# Patient Record
Sex: FEMALE | Race: WHITE | NOT HISPANIC OR LATINO | Employment: OTHER | URBAN - METROPOLITAN AREA
[De-identification: names, ages, dates, MRNs, and addresses within clinical notes are randomized per-mention and may not be internally consistent; named-entity substitution may affect disease eponyms.]

---

## 2023-01-01 ENCOUNTER — HOSPITAL ENCOUNTER (INPATIENT)
Facility: HOSPITAL | Age: 78
LOS: 1 days | Discharge: HOSPICE | DRG: 853 | End: 2023-06-27
Attending: EMERGENCY MEDICINE | Admitting: INTERNAL MEDICINE
Payer: MEDICARE

## 2023-01-01 ENCOUNTER — HOSPITAL ENCOUNTER (INPATIENT)
Facility: HOSPITAL | Age: 78
LOS: 1 days | DRG: 951 | End: 2023-06-28
Attending: INTERNAL MEDICINE | Admitting: INTERNAL MEDICINE
Payer: MEDICARE

## 2023-01-01 ENCOUNTER — HOSPITAL ENCOUNTER (EMERGENCY)
Facility: HOSPITAL | Age: 78
Discharge: HOME | End: 2023-06-14
Attending: EMERGENCY MEDICINE
Payer: MEDICARE

## 2023-01-01 VITALS
HEART RATE: 90 BPM | SYSTOLIC BLOOD PRESSURE: 170 MMHG | RESPIRATION RATE: 16 BRPM | BODY MASS INDEX: 22.08 KG/M2 | DIASTOLIC BLOOD PRESSURE: 80 MMHG | HEIGHT: 62 IN | WEIGHT: 120 LBS | OXYGEN SATURATION: 98 % | TEMPERATURE: 99 F

## 2023-01-01 VITALS
RESPIRATION RATE: 7 BRPM | SYSTOLIC BLOOD PRESSURE: 93 MMHG | OXYGEN SATURATION: 96 % | TEMPERATURE: 99 F | DIASTOLIC BLOOD PRESSURE: 50 MMHG

## 2023-01-01 VITALS
WEIGHT: 120 LBS | HEIGHT: 61 IN | HEART RATE: 61 BPM | DIASTOLIC BLOOD PRESSURE: 59 MMHG | RESPIRATION RATE: 34 BRPM | OXYGEN SATURATION: 95 % | TEMPERATURE: 98 F | SYSTOLIC BLOOD PRESSURE: 104 MMHG | BODY MASS INDEX: 22.66 KG/M2
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
## 2023-01-01 DIAGNOSIS — R29.6 FREQUENT FALLS: Primary | ICD-10-CM

## 2023-01-01 DIAGNOSIS — R41.82 ALTERED MENTAL STATUS: ICD-10-CM

## 2023-01-01 DIAGNOSIS — R57.9 SHOCK: Primary | ICD-10-CM

## 2023-01-01 DIAGNOSIS — R53.1 WEAKNESS: ICD-10-CM

## 2023-01-01 DIAGNOSIS — I51.3 LEFT VENTRICULAR THROMBUS: ICD-10-CM

## 2023-01-01 DIAGNOSIS — N17.9 AKI (ACUTE KIDNEY INJURY): ICD-10-CM

## 2023-01-01 DIAGNOSIS — I95.9 HYPOTENSION: ICD-10-CM

## 2023-01-01 LAB
ABO + RH BLD: NORMAL
ALBUMIN SERPL BCP-MCNC: 3 G/DL (ref 3.5–5.2)
ALBUMIN SERPL BCP-MCNC: 3.5 G/DL (ref 3.5–5.2)
ALLENS TEST: ABNORMAL
ALP SERPL-CCNC: 112 U/L (ref 55–135)
ALP SERPL-CCNC: 251 U/L (ref 55–135)
ALT SERPL W/O P-5'-P-CCNC: 20 U/L (ref 10–44)
ALT SERPL W/O P-5'-P-CCNC: 2411 U/L (ref 10–44)
AMPHET+METHAMPHET UR QL: NEGATIVE
AMPHET+METHAMPHET UR QL: NEGATIVE
ANION GAP SERPL CALC-SCNC: 13 MMOL/L (ref 8–16)
ANION GAP SERPL CALC-SCNC: 16 MMOL/L (ref 8–16)
ANION GAP SERPL CALC-SCNC: 17 MMOL/L (ref 8–16)
ANION GAP SERPL CALC-SCNC: 18 MMOL/L (ref 8–16)
ANION GAP SERPL CALC-SCNC: 19 MMOL/L (ref 8–16)
APAP SERPL-MCNC: <3 UG/ML (ref 10–20)
ASCENDING AORTA: 3.03 CM
AST SERPL-CCNC: 2083 U/L (ref 10–40)
AST SERPL-CCNC: 21 U/L (ref 10–40)
BACTERIA #/AREA URNS AUTO: ABNORMAL /HPF
BACTERIA #/AREA URNS AUTO: ABNORMAL /HPF
BACTERIA UR CULT: ABNORMAL
BARBITURATES UR QL SCN>200 NG/ML: NEGATIVE
BARBITURATES UR QL SCN>200 NG/ML: NEGATIVE
BASOPHILS # BLD AUTO: 0.02 K/UL (ref 0–0.2)
BASOPHILS # BLD AUTO: 0.06 K/UL (ref 0–0.2)
BASOPHILS NFR BLD: 0.2 % (ref 0–1.9)
BASOPHILS NFR BLD: 0.3 % (ref 0–1.9)
BENZODIAZ UR QL SCN>200 NG/ML: NEGATIVE
BENZODIAZ UR QL SCN>200 NG/ML: NEGATIVE
BILIRUB SERPL-MCNC: 0.6 MG/DL (ref 0.1–1)
BILIRUB SERPL-MCNC: 1.5 MG/DL (ref 0.1–1)
BILIRUB UR QL STRIP: NEGATIVE
BILIRUB UR QL STRIP: NEGATIVE
BLD GP AB SCN CELLS X3 SERPL QL: NORMAL
BNP SERPL-MCNC: 3784 PG/ML (ref 0–99)
BSA FOR ECHO PROCEDURE: 1.53 M2
BUN SERPL-MCNC: 23 MG/DL (ref 8–23)
BUN SERPL-MCNC: 58 MG/DL (ref 8–23)
BUN SERPL-MCNC: 60 MG/DL (ref 8–23)
BUN SERPL-MCNC: 65 MG/DL (ref 6–30)
BUN SERPL-MCNC: 66 MG/DL (ref 8–23)
BUN SERPL-MCNC: 66 MG/DL (ref 8–23)
BZE UR QL SCN: NEGATIVE
BZE UR QL SCN: NEGATIVE
CALCIUM SERPL-MCNC: 7.6 MG/DL (ref 8.7–10.5)
CALCIUM SERPL-MCNC: 7.8 MG/DL (ref 8.7–10.5)
CALCIUM SERPL-MCNC: 7.9 MG/DL (ref 8.7–10.5)
CALCIUM SERPL-MCNC: 8.3 MG/DL (ref 8.7–10.5)
CALCIUM SERPL-MCNC: 8.8 MG/DL (ref 8.7–10.5)
CANNABINOIDS UR QL SCN: NEGATIVE
CANNABINOIDS UR QL SCN: NEGATIVE
CHLORIDE SERPL-SCNC: 100 MMOL/L (ref 95–110)
CHLORIDE SERPL-SCNC: 102 MMOL/L (ref 95–110)
CHLORIDE SERPL-SCNC: 103 MMOL/L (ref 95–110)
CHLORIDE SERPL-SCNC: 103 MMOL/L (ref 95–110)
CHLORIDE SERPL-SCNC: 106 MMOL/L (ref 95–110)
CHLORIDE SERPL-SCNC: 95 MMOL/L (ref 95–110)
CK SERPL-CCNC: 434 U/L (ref 20–180)
CK SERPL-CCNC: 466 U/L (ref 20–180)
CLARITY UR REFRACT.AUTO: ABNORMAL
CLARITY UR REFRACT.AUTO: ABNORMAL
CO2 SERPL-SCNC: 12 MMOL/L (ref 23–29)
CO2 SERPL-SCNC: 12 MMOL/L (ref 23–29)
CO2 SERPL-SCNC: 20 MMOL/L (ref 23–29)
CO2 SERPL-SCNC: 7 MMOL/L (ref 23–29)
CO2 SERPL-SCNC: 9 MMOL/L (ref 23–29)
COLOR UR AUTO: YELLOW
COLOR UR AUTO: YELLOW
CREAT SERPL-MCNC: 0.8 MG/DL (ref 0.5–1.4)
CREAT SERPL-MCNC: 1.6 MG/DL (ref 0.5–1.4)
CREAT SERPL-MCNC: 1.7 MG/DL (ref 0.5–1.4)
CREAT SERPL-MCNC: 1.9 MG/DL (ref 0.5–1.4)
CREAT SERPL-MCNC: 2 MG/DL (ref 0.5–1.4)
CREAT SERPL-MCNC: 2.1 MG/DL (ref 0.5–1.4)
CREAT UR-MCNC: 67 MG/DL (ref 15–325)
CREAT UR-MCNC: 75 MG/DL (ref 15–325)
CV ECHO LV RWT: 0.36 CM
DELSYS: ABNORMAL
DELSYS: ABNORMAL
DIFFERENTIAL METHOD: ABNORMAL
DIFFERENTIAL METHOD: ABNORMAL
DOP CALC LVOT AREA: 3.1 CM2
DOP CALC LVOT DIAMETER: 1.98 CM
DOP CALC LVOT PEAK VEL: 0.45 M/S
DOP CALC LVOT STROKE VOLUME: 21.57 CM3
DOP CALCLVOT PEAK VEL VTI: 7.01 CM
ECHO LV POSTERIOR WALL: 0.97 CM (ref 0.6–1.1)
EJECTION FRACTION: 20 %
EOSINOPHIL # BLD AUTO: 0.1 K/UL (ref 0–0.5)
EOSINOPHIL # BLD AUTO: 0.1 K/UL (ref 0–0.5)
EOSINOPHIL NFR BLD: 0.4 % (ref 0–8)
EOSINOPHIL NFR BLD: 0.7 % (ref 0–8)
ERYTHROCYTE [DISTWIDTH] IN BLOOD BY AUTOMATED COUNT: 17 % (ref 11.5–14.5)
ERYTHROCYTE [DISTWIDTH] IN BLOOD BY AUTOMATED COUNT: 17 % (ref 11.5–14.5)
EST. GFR  (NO RACE VARIABLE): 24.1 ML/MIN/1.73 M^2
EST. GFR  (NO RACE VARIABLE): 27.2 ML/MIN/1.73 M^2
EST. GFR  (NO RACE VARIABLE): 31.1 ML/MIN/1.73 M^2
EST. GFR  (NO RACE VARIABLE): 33.4 ML/MIN/1.73 M^2
EST. GFR  (NO RACE VARIABLE): >60 ML/MIN/1.73 M^2
ETHANOL SERPL-MCNC: <10 MG/DL
ETHANOL SERPL-MCNC: <10 MG/DL
ETHANOL UR-MCNC: <10 MG/DL
FLOW: 15
FRACTIONAL SHORTENING: 10 % (ref 28–44)
GLUCOSE SERPL-MCNC: 104 MG/DL (ref 70–110)
GLUCOSE SERPL-MCNC: 105 MG/DL (ref 70–110)
GLUCOSE SERPL-MCNC: 106 MG/DL (ref 70–110)
GLUCOSE SERPL-MCNC: 110 MG/DL (ref 70–110)
GLUCOSE SERPL-MCNC: 91 MG/DL (ref 70–110)
GLUCOSE SERPL-MCNC: 99 MG/DL (ref 70–110)
GLUCOSE UR QL STRIP: NEGATIVE
GLUCOSE UR QL STRIP: NEGATIVE
HAV IGM SERPL QL IA: NORMAL
HBV CORE IGM SERPL QL IA: NORMAL
HBV SURFACE AG SERPL QL IA: NORMAL
HCO3 UR-SCNC: 14.1 MMOL/L (ref 24–28)
HCO3 UR-SCNC: 15.1 MMOL/L (ref 24–28)
HCO3 UR-SCNC: 8.9 MMOL/L (ref 24–28)
HCT VFR BLD AUTO: 36.3 % (ref 37–48.5)
HCT VFR BLD AUTO: 36.4 % (ref 37–48.5)
HCT VFR BLD CALC: 37 %PCV (ref 36–54)
HCT VFR BLD CALC: 39 %PCV (ref 36–54)
HCV AB SERPL QL IA: NORMAL
HCV AB SERPL QL IA: NORMAL
HGB BLD-MCNC: 11 G/DL (ref 12–16)
HGB BLD-MCNC: 11.5 G/DL (ref 12–16)
HGB UR QL STRIP: NEGATIVE
HGB UR QL STRIP: NEGATIVE
HIV 1+2 AB+HIV1 P24 AG SERPL QL IA: NORMAL
HYALINE CASTS UR QL AUTO: 11 /LPF
HYALINE CASTS UR QL AUTO: 4 /LPF
IMM GRANULOCYTES # BLD AUTO: 0.04 K/UL (ref 0–0.04)
IMM GRANULOCYTES # BLD AUTO: 0.3 K/UL (ref 0–0.04)
IMM GRANULOCYTES NFR BLD AUTO: 0.5 % (ref 0–0.5)
IMM GRANULOCYTES NFR BLD AUTO: 1.7 % (ref 0–0.5)
INR PPP: 2.6 (ref 0.8–1.2)
INTERVENTRICULAR SEPTUM: 0.92 CM (ref 0.6–1.1)
KETONES UR QL STRIP: NEGATIVE
KETONES UR QL STRIP: NEGATIVE
LA MAJOR: 6.57 CM
LA MINOR: 5.87 CM
LA WIDTH: 4.24 CM
LACTATE SERPL-SCNC: 5.6 MMOL/L (ref 0.5–2.2)
LACTATE SERPL-SCNC: 6.9 MMOL/L (ref 0.5–2.2)
LDH SERPL L TO P-CCNC: 5.46 MMOL/L (ref 0.5–2.2)
LDH SERPL L TO P-CCNC: 7.46 MMOL/L (ref 0.5–2.2)
LEFT ATRIUM SIZE: 5.13 CM
LEFT ATRIUM VOLUME INDEX: 75.4 ML/M2
LEFT ATRIUM VOLUME: 114.63 CM3
LEFT INTERNAL DIMENSION IN SYSTOLE: 4.86 CM (ref 2.1–4)
LEFT VENTRICLE DIASTOLIC VOLUME INDEX: 93.14 ML/M2
LEFT VENTRICLE DIASTOLIC VOLUME: 141.58 ML
LEFT VENTRICLE MASS INDEX: 126 G/M2
LEFT VENTRICLE SYSTOLIC VOLUME INDEX: 72.6 ML/M2
LEFT VENTRICLE SYSTOLIC VOLUME: 110.42 ML
LEFT VENTRICULAR INTERNAL DIMENSION IN DIASTOLE: 5.4 CM (ref 3.5–6)
LEFT VENTRICULAR MASS: 191.92 G
LEUKOCYTE ESTERASE UR QL STRIP: ABNORMAL
LEUKOCYTE ESTERASE UR QL STRIP: ABNORMAL
LIPASE SERPL-CCNC: 25 U/L (ref 4–60)
LYMPHOCYTES # BLD AUTO: 1 K/UL (ref 1–4.8)
LYMPHOCYTES # BLD AUTO: 1.6 K/UL (ref 1–4.8)
LYMPHOCYTES NFR BLD: 19.8 % (ref 18–48)
LYMPHOCYTES NFR BLD: 5.6 % (ref 18–48)
MAGNESIUM SERPL-MCNC: 2.3 MG/DL (ref 1.6–2.6)
MCH RBC QN AUTO: 26.4 PG (ref 27–31)
MCH RBC QN AUTO: 27.5 PG (ref 27–31)
MCHC RBC AUTO-ENTMCNC: 30.2 G/DL (ref 32–36)
MCHC RBC AUTO-ENTMCNC: 31.7 G/DL (ref 32–36)
MCV RBC AUTO: 83 FL (ref 82–98)
MCV RBC AUTO: 91 FL (ref 82–98)
METHADONE UR QL SCN>300 NG/ML: NEGATIVE
METHADONE UR QL SCN>300 NG/ML: NEGATIVE
MICROSCOPIC COMMENT: ABNORMAL
MICROSCOPIC COMMENT: ABNORMAL
MODE: ABNORMAL
MODE: ABNORMAL
MONOCYTES # BLD AUTO: 1 K/UL (ref 0.3–1)
MONOCYTES # BLD AUTO: 1.5 K/UL (ref 0.3–1)
MONOCYTES NFR BLD: 12.5 % (ref 4–15)
MONOCYTES NFR BLD: 8.7 % (ref 4–15)
NEUTROPHILS # BLD AUTO: 14.3 K/UL (ref 1.8–7.7)
NEUTROPHILS # BLD AUTO: 5.4 K/UL (ref 1.8–7.7)
NEUTROPHILS NFR BLD: 66.3 % (ref 38–73)
NEUTROPHILS NFR BLD: 83.3 % (ref 38–73)
NITRITE UR QL STRIP: NEGATIVE
NITRITE UR QL STRIP: NEGATIVE
NRBC BLD-RTO: 0 /100 WBC
NRBC BLD-RTO: 0 /100 WBC
OPIATES UR QL SCN: NEGATIVE
OPIATES UR QL SCN: NEGATIVE
PCO2 BLDA: 28.8 MMHG (ref 35–45)
PCO2 BLDA: 45.5 MMHG (ref 35–45)
PCO2 BLDA: 47 MMHG (ref 35–45)
PCP UR QL SCN>25 NG/ML: NEGATIVE
PCP UR QL SCN>25 NG/ML: NEGATIVE
PH SMN: 7.1 [PH] (ref 7.35–7.45)
PH SMN: 7.1 [PH] (ref 7.35–7.45)
PH SMN: 7.11 [PH] (ref 7.35–7.45)
PH UR STRIP: 6 [PH] (ref 5–8)
PH UR STRIP: 6 [PH] (ref 5–8)
PISA TR MAX VEL: 1.62 M/S
PLATELET # BLD AUTO: 308 K/UL (ref 150–450)
PLATELET # BLD AUTO: 325 K/UL (ref 150–450)
PMV BLD AUTO: 10 FL (ref 9.2–12.9)
PMV BLD AUTO: 9.6 FL (ref 9.2–12.9)
PO2 BLDA: 19 MMHG (ref 40–60)
PO2 BLDA: 24 MMHG (ref 40–60)
PO2 BLDA: 33 MMHG (ref 40–60)
POC BE: -14 MMOL/L
POC BE: -16 MMOL/L
POC BE: -21 MMOL/L
POC IONIZED CALCIUM: 1.06 MMOL/L (ref 1.06–1.42)
POC IONIZED CALCIUM: 1.08 MMOL/L (ref 1.06–1.42)
POC SATURATED O2: 18 % (ref 95–100)
POC SATURATED O2: 27 % (ref 95–100)
POC SATURATED O2: 45 % (ref 95–100)
POC TCO2 (MEASURED): 14 MMOL/L (ref 23–29)
POC TCO2: 10 MMOL/L (ref 24–29)
POC TCO2: 16 MMOL/L (ref 24–29)
POC TCO2: 16 MMOL/L (ref 24–29)
POCT GLUCOSE: 110 MG/DL (ref 70–110)
POCT GLUCOSE: 119 MG/DL (ref 70–110)
POCT GLUCOSE: 177 MG/DL (ref 70–110)
POTASSIUM BLD-SCNC: 7.1 MMOL/L (ref 3.5–5.1)
POTASSIUM BLD-SCNC: 7.8 MMOL/L (ref 3.5–5.1)
POTASSIUM SERPL-SCNC: 3.5 MMOL/L (ref 3.5–5.1)
POTASSIUM SERPL-SCNC: 5.9 MMOL/L (ref 3.5–5.1)
POTASSIUM SERPL-SCNC: 6 MMOL/L (ref 3.5–5.1)
POTASSIUM SERPL-SCNC: 6.5 MMOL/L (ref 3.5–5.1)
POTASSIUM SERPL-SCNC: 7 MMOL/L (ref 3.5–5.1)
PROCALCITONIN SERPL IA-MCNC: 0.66 NG/ML
PROT SERPL-MCNC: 5.2 G/DL (ref 6–8.4)
PROT SERPL-MCNC: 6.4 G/DL (ref 6–8.4)
PROT UR QL STRIP: ABNORMAL
PROT UR QL STRIP: ABNORMAL
PROTHROMBIN TIME: 26.7 SEC (ref 9–12.5)
QEF: 21 %
RA MAJOR: 6.12 CM
RA WIDTH: 4.14 CM
RBC # BLD AUTO: 4 M/UL (ref 4–5.4)
RBC # BLD AUTO: 4.35 M/UL (ref 4–5.4)
RBC #/AREA URNS AUTO: 0 /HPF (ref 0–4)
RBC #/AREA URNS AUTO: 1 /HPF (ref 0–4)
RIGHT VENTRICULAR END-DIASTOLIC DIMENSION: 3.17 CM
SALICYLATES SERPL-MCNC: <5 MG/DL (ref 15–30)
SAMPLE: ABNORMAL
SINUS: 3.03 CM
SITE: ABNORMAL
SODIUM BLD-SCNC: 132 MMOL/L (ref 136–145)
SODIUM BLD-SCNC: 132 MMOL/L (ref 136–145)
SODIUM SERPL-SCNC: 126 MMOL/L (ref 136–145)
SODIUM SERPL-SCNC: 128 MMOL/L (ref 136–145)
SODIUM SERPL-SCNC: 128 MMOL/L (ref 136–145)
SODIUM SERPL-SCNC: 129 MMOL/L (ref 136–145)
SODIUM SERPL-SCNC: 139 MMOL/L (ref 136–145)
SP GR UR STRIP: 1.01 (ref 1–1.03)
SP GR UR STRIP: 1.01 (ref 1–1.03)
SPECIMEN OUTDATE: NORMAL
SQUAMOUS #/AREA URNS AUTO: 1 /HPF
SQUAMOUS #/AREA URNS AUTO: 2 /HPF
STJ: 2.89 CM
TDI LATERAL: 0.05 M/S
TDI SEPTAL: 0.04 M/S
TDI: 0.05 M/S
TOXICOLOGY INFORMATION: NORMAL
TOXICOLOGY INFORMATION: NORMAL
TR MAX PG: 10 MMHG
TROPONIN I SERPL DL<=0.01 NG/ML-MCNC: 0.06 NG/ML (ref 0–0.03)
TROPONIN I SERPL DL<=0.01 NG/ML-MCNC: 0.06 NG/ML (ref 0–0.03)
TSH SERPL DL<=0.005 MIU/L-ACNC: 2.05 UIU/ML (ref 0.4–4)
TSH SERPL DL<=0.005 MIU/L-ACNC: 3.61 UIU/ML (ref 0.4–4)
URN SPEC COLLECT METH UR: ABNORMAL
URN SPEC COLLECT METH UR: ABNORMAL
WBC # BLD AUTO: 17.19 K/UL (ref 3.9–12.7)
WBC # BLD AUTO: 8.1 K/UL (ref 3.9–12.7)
WBC #/AREA URNS AUTO: 20 /HPF (ref 0–5)
WBC #/AREA URNS AUTO: 6 /HPF (ref 0–5)

## 2023-01-01 PROCEDURE — 87088 URINE BACTERIA CULTURE: CPT | Performed by: EMERGENCY MEDICINE

## 2023-01-01 PROCEDURE — 99284 EMERGENCY DEPT VISIT MOD MDM: CPT | Mod: ,,, | Performed by: EMERGENCY MEDICINE

## 2023-01-01 PROCEDURE — C1751 CATH, INF, PER/CENT/MIDLINE: HCPCS

## 2023-01-01 PROCEDURE — 25500020 PHARM REV CODE 255: Performed by: INTERNAL MEDICINE

## 2023-01-01 PROCEDURE — 63600175 PHARM REV CODE 636 W HCPCS: Performed by: NURSE PRACTITIONER

## 2023-01-01 PROCEDURE — 25000003 PHARM REV CODE 250: Performed by: NURSE PRACTITIONER

## 2023-01-01 PROCEDURE — 25000003 PHARM REV CODE 250: Performed by: EMERGENCY MEDICINE

## 2023-01-01 PROCEDURE — 84132 ASSAY OF SERUM POTASSIUM: CPT

## 2023-01-01 PROCEDURE — 84484 ASSAY OF TROPONIN QUANT: CPT | Mod: 91 | Performed by: EMERGENCY MEDICINE

## 2023-01-01 PROCEDURE — 85025 COMPLETE CBC W/AUTO DIFF WBC: CPT | Performed by: EMERGENCY MEDICINE

## 2023-01-01 PROCEDURE — 99223 PR INITIAL HOSPITAL CARE,LEVL III: ICD-10-PCS | Mod: 25,GC,, | Performed by: OTOLARYNGOLOGY

## 2023-01-01 PROCEDURE — 86900 BLOOD TYPING SEROLOGIC ABO: CPT | Performed by: EMERGENCY MEDICINE

## 2023-01-01 PROCEDURE — 63600175 PHARM REV CODE 636 W HCPCS

## 2023-01-01 PROCEDURE — 99499 NO LOS: ICD-10-PCS | Mod: ,,, | Performed by: INTERNAL MEDICINE

## 2023-01-01 PROCEDURE — 99900035 HC TECH TIME PER 15 MIN (STAT)

## 2023-01-01 PROCEDURE — 20000000 HC ICU ROOM

## 2023-01-01 PROCEDURE — 99223 1ST HOSP IP/OBS HIGH 75: CPT | Mod: 25,GC,, | Performed by: OTOLARYNGOLOGY

## 2023-01-01 PROCEDURE — 63600175 PHARM REV CODE 636 W HCPCS: Performed by: INTERNAL MEDICINE

## 2023-01-01 PROCEDURE — 99284 EMERGENCY DEPT VISIT MOD MDM: CPT | Mod: 25

## 2023-01-01 PROCEDURE — 96376 TX/PRO/DX INJ SAME DRUG ADON: CPT

## 2023-01-01 PROCEDURE — 80179 DRUG ASSAY SALICYLATE: CPT | Performed by: EMERGENCY MEDICINE

## 2023-01-01 PROCEDURE — 80074 ACUTE HEPATITIS PANEL: CPT | Performed by: STUDENT IN AN ORGANIZED HEALTH CARE EDUCATION/TRAINING PROGRAM

## 2023-01-01 PROCEDURE — 96366 THER/PROPH/DIAG IV INF ADDON: CPT

## 2023-01-01 PROCEDURE — 81001 URINALYSIS AUTO W/SCOPE: CPT | Performed by: EMERGENCY MEDICINE

## 2023-01-01 PROCEDURE — 63600175 PHARM REV CODE 636 W HCPCS: Performed by: STUDENT IN AN ORGANIZED HEALTH CARE EDUCATION/TRAINING PROGRAM

## 2023-01-01 PROCEDURE — 82550 ASSAY OF CK (CPK): CPT | Performed by: STUDENT IN AN ORGANIZED HEALTH CARE EDUCATION/TRAINING PROGRAM

## 2023-01-01 PROCEDURE — 82077 ASSAY SPEC XCP UR&BREATH IA: CPT | Performed by: PHYSICIAN ASSISTANT

## 2023-01-01 PROCEDURE — 84443 ASSAY THYROID STIM HORMONE: CPT | Performed by: EMERGENCY MEDICINE

## 2023-01-01 PROCEDURE — 96375 TX/PRO/DX INJ NEW DRUG ADDON: CPT

## 2023-01-01 PROCEDURE — 63600175 PHARM REV CODE 636 W HCPCS: Performed by: EMERGENCY MEDICINE

## 2023-01-01 PROCEDURE — 82803 BLOOD GASES ANY COMBINATION: CPT

## 2023-01-01 PROCEDURE — 85014 HEMATOCRIT: CPT

## 2023-01-01 PROCEDURE — 99499 UNLISTED E&M SERVICE: CPT | Mod: ,,, | Performed by: INTERNAL MEDICINE

## 2023-01-01 PROCEDURE — 99291 PR CRITICAL CARE, E/M 30-74 MINUTES: ICD-10-PCS | Mod: ,,, | Performed by: INTERNAL MEDICINE

## 2023-01-01 PROCEDURE — 99285 EMERGENCY DEPT VISIT HI MDM: CPT | Mod: 25

## 2023-01-01 PROCEDURE — 99291 PR CRITICAL CARE, E/M 30-74 MINUTES: ICD-10-PCS | Mod: ,,, | Performed by: NURSE PRACTITIONER

## 2023-01-01 PROCEDURE — 83605 ASSAY OF LACTIC ACID: CPT

## 2023-01-01 PROCEDURE — 93010 EKG 12-LEAD: ICD-10-PCS | Mod: ,,, | Performed by: INTERNAL MEDICINE

## 2023-01-01 PROCEDURE — 84295 ASSAY OF SERUM SODIUM: CPT

## 2023-01-01 PROCEDURE — 99284 PR EMERGENCY DEPT VISIT,LEVEL IV: ICD-10-PCS | Mod: ,,, | Performed by: EMERGENCY MEDICINE

## 2023-01-01 PROCEDURE — 80143 DRUG ASSAY ACETAMINOPHEN: CPT | Performed by: NURSE PRACTITIONER

## 2023-01-01 PROCEDURE — 85610 PROTHROMBIN TIME: CPT | Performed by: EMERGENCY MEDICINE

## 2023-01-01 PROCEDURE — 82330 ASSAY OF CALCIUM: CPT

## 2023-01-01 PROCEDURE — 80048 BASIC METABOLIC PNL TOTAL CA: CPT | Mod: 91,XB | Performed by: NURSE PRACTITIONER

## 2023-01-01 PROCEDURE — 25000003 PHARM REV CODE 250: Performed by: INTERNAL MEDICINE

## 2023-01-01 PROCEDURE — 87389 HIV-1 AG W/HIV-1&-2 AB AG IA: CPT | Performed by: PHYSICIAN ASSISTANT

## 2023-01-01 PROCEDURE — 86803 HEPATITIS C AB TEST: CPT | Performed by: PHYSICIAN ASSISTANT

## 2023-01-01 PROCEDURE — 25000003 PHARM REV CODE 250: Performed by: STUDENT IN AN ORGANIZED HEALTH CARE EDUCATION/TRAINING PROGRAM

## 2023-01-01 PROCEDURE — 96368 THER/DIAG CONCURRENT INF: CPT

## 2023-01-01 PROCEDURE — 87086 URINE CULTURE/COLONY COUNT: CPT | Performed by: EMERGENCY MEDICINE

## 2023-01-01 PROCEDURE — 84484 ASSAY OF TROPONIN QUANT: CPT | Performed by: STUDENT IN AN ORGANIZED HEALTH CARE EDUCATION/TRAINING PROGRAM

## 2023-01-01 PROCEDURE — 81001 URINALYSIS AUTO W/SCOPE: CPT | Mod: 59 | Performed by: PHYSICIAN ASSISTANT

## 2023-01-01 PROCEDURE — 93010 ELECTROCARDIOGRAM REPORT: CPT | Mod: ,,, | Performed by: INTERNAL MEDICINE

## 2023-01-01 PROCEDURE — 93005 ELECTROCARDIOGRAM TRACING: CPT

## 2023-01-01 PROCEDURE — 84443 ASSAY THYROID STIM HORMONE: CPT | Performed by: PHYSICIAN ASSISTANT

## 2023-01-01 PROCEDURE — 94761 N-INVAS EAR/PLS OXIMETRY MLT: CPT

## 2023-01-01 PROCEDURE — 85025 COMPLETE CBC W/AUTO DIFF WBC: CPT | Performed by: PHYSICIAN ASSISTANT

## 2023-01-01 PROCEDURE — 36410 VNPNXR 3YR/> PHY/QHP DX/THER: CPT

## 2023-01-01 PROCEDURE — 80053 COMPREHEN METABOLIC PANEL: CPT | Performed by: EMERGENCY MEDICINE

## 2023-01-01 PROCEDURE — 82962 GLUCOSE BLOOD TEST: CPT

## 2023-01-01 PROCEDURE — 83605 ASSAY OF LACTIC ACID: CPT | Mod: 91 | Performed by: STUDENT IN AN ORGANIZED HEALTH CARE EDUCATION/TRAINING PROGRAM

## 2023-01-01 PROCEDURE — 99291 CRITICAL CARE FIRST HOUR: CPT | Mod: ,,, | Performed by: INTERNAL MEDICINE

## 2023-01-01 PROCEDURE — 25000003 PHARM REV CODE 250

## 2023-01-01 PROCEDURE — 82077 ASSAY SPEC XCP UR&BREATH IA: CPT | Performed by: EMERGENCY MEDICINE

## 2023-01-01 PROCEDURE — 87040 BLOOD CULTURE FOR BACTERIA: CPT | Performed by: EMERGENCY MEDICINE

## 2023-01-01 PROCEDURE — 27000221 HC OXYGEN, UP TO 24 HOURS

## 2023-01-01 PROCEDURE — 51702 INSERT TEMP BLADDER CATH: CPT

## 2023-01-01 PROCEDURE — 86920 COMPATIBILITY TEST SPIN: CPT | Performed by: NURSE PRACTITIONER

## 2023-01-01 PROCEDURE — 80307 DRUG TEST PRSMV CHEM ANLYZR: CPT | Performed by: PHYSICIAN ASSISTANT

## 2023-01-01 PROCEDURE — 82550 ASSAY OF CK (CPK): CPT | Mod: 91 | Performed by: EMERGENCY MEDICINE

## 2023-01-01 PROCEDURE — 83735 ASSAY OF MAGNESIUM: CPT | Performed by: EMERGENCY MEDICINE

## 2023-01-01 PROCEDURE — 83605 ASSAY OF LACTIC ACID: CPT | Performed by: NURSE PRACTITIONER

## 2023-01-01 PROCEDURE — 96365 THER/PROPH/DIAG IV INF INIT: CPT

## 2023-01-01 PROCEDURE — 84145 PROCALCITONIN (PCT): CPT | Performed by: EMERGENCY MEDICINE

## 2023-01-01 PROCEDURE — 76937 US GUIDE VASCULAR ACCESS: CPT

## 2023-01-01 PROCEDURE — 80048 BASIC METABOLIC PNL TOTAL CA: CPT | Mod: XB | Performed by: STUDENT IN AN ORGANIZED HEALTH CARE EDUCATION/TRAINING PROGRAM

## 2023-01-01 PROCEDURE — 41120 PARTIAL REMOVAL OF TONGUE: CPT | Mod: ,,, | Performed by: OTOLARYNGOLOGY

## 2023-01-01 PROCEDURE — 80307 DRUG TEST PRSMV CHEM ANLYZR: CPT | Performed by: STUDENT IN AN ORGANIZED HEALTH CARE EDUCATION/TRAINING PROGRAM

## 2023-01-01 PROCEDURE — 80053 COMPREHEN METABOLIC PANEL: CPT | Performed by: PHYSICIAN ASSISTANT

## 2023-01-01 PROCEDURE — 41120 PR PART REMOVAL TONGUE,<1/2: ICD-10-PCS | Mod: ,,, | Performed by: OTOLARYNGOLOGY

## 2023-01-01 PROCEDURE — 87186 SC STD MICRODIL/AGAR DIL: CPT | Mod: 59 | Performed by: EMERGENCY MEDICINE

## 2023-01-01 PROCEDURE — 80048 BASIC METABOLIC PNL TOTAL CA: CPT | Mod: 91,XB | Performed by: INTERNAL MEDICINE

## 2023-01-01 PROCEDURE — 83690 ASSAY OF LIPASE: CPT | Performed by: EMERGENCY MEDICINE

## 2023-01-01 PROCEDURE — 87077 CULTURE AEROBIC IDENTIFY: CPT | Mod: 59 | Performed by: EMERGENCY MEDICINE

## 2023-01-01 PROCEDURE — 83880 ASSAY OF NATRIURETIC PEPTIDE: CPT | Performed by: EMERGENCY MEDICINE

## 2023-01-01 PROCEDURE — 99291 CRITICAL CARE FIRST HOUR: CPT | Mod: ,,, | Performed by: NURSE PRACTITIONER

## 2023-01-01 RX ORDER — DEXTROAMPHETAMINE SACCHARATE, AMPHETAMINE ASPARTATE, DEXTROAMPHETAMINE SULFATE AND AMPHETAMINE SULFATE 7.5; 7.5; 7.5; 7.5 MG/1; MG/1; MG/1; MG/1
TABLET ORAL
COMMUNITY

## 2023-01-01 RX ORDER — NOREPINEPHRINE BITARTRATE/D5W 4MG/250ML
0-3 PLASTIC BAG, INJECTION (ML) INTRAVENOUS CONTINUOUS
Status: DISCONTINUED | OUTPATIENT
Start: 2023-01-01 | End: 2023-01-01

## 2023-01-01 RX ORDER — AMIODARONE HYDROCHLORIDE 200 MG/1
TABLET ORAL
COMMUNITY

## 2023-01-01 RX ORDER — CALCIUM GLUCONATE 20 MG/ML
1 INJECTION, SOLUTION INTRAVENOUS
Status: COMPLETED | OUTPATIENT
Start: 2023-01-01 | End: 2023-01-01

## 2023-01-01 RX ORDER — HALOPERIDOL 5 MG/ML
1 INJECTION INTRAMUSCULAR EVERY 4 HOURS PRN
Status: CANCELLED | OUTPATIENT
Start: 2023-01-01

## 2023-01-01 RX ORDER — DILTIAZEM HYDROCHLORIDE 120 MG/1
CAPSULE, COATED, EXTENDED RELEASE ORAL
COMMUNITY

## 2023-01-01 RX ORDER — BUPROPION HYDROCHLORIDE 150 MG/1
TABLET ORAL
COMMUNITY

## 2023-01-01 RX ORDER — SCOLOPAMINE TRANSDERMAL SYSTEM 1 MG/1
1 PATCH, EXTENDED RELEASE TRANSDERMAL
Status: DISCONTINUED | OUTPATIENT
Start: 2023-01-01 | End: 2023-01-01 | Stop reason: HOSPADM

## 2023-01-01 RX ORDER — SCOLOPAMINE TRANSDERMAL SYSTEM 1 MG/1
1 PATCH, EXTENDED RELEASE TRANSDERMAL
Status: CANCELLED | OUTPATIENT
Start: 2023-06-30

## 2023-01-01 RX ORDER — CALCIUM GLUCONATE 98 MG/ML
INJECTION, SOLUTION INTRAVENOUS
Status: DISPENSED
Start: 2023-01-01 | End: 2023-01-01

## 2023-01-01 RX ORDER — DEXMEDETOMIDINE HYDROCHLORIDE 4 UG/ML
INJECTION, SOLUTION INTRAVENOUS
Status: COMPLETED
Start: 2023-01-01 | End: 2023-01-01

## 2023-01-01 RX ORDER — SODIUM CHLORIDE 0.9 % (FLUSH) 0.9 %
10 SYRINGE (ML) INJECTION
Status: DISCONTINUED | OUTPATIENT
Start: 2023-01-01 | End: 2023-01-01 | Stop reason: HOSPADM

## 2023-01-01 RX ORDER — CHLORDIAZEPOXIDE HYDROCHLORIDE 5 MG/1
CAPSULE, GELATIN COATED ORAL
COMMUNITY

## 2023-01-01 RX ORDER — HYDROMORPHONE HYDROCHLORIDE 1 MG/ML
0.5 INJECTION, SOLUTION INTRAMUSCULAR; INTRAVENOUS; SUBCUTANEOUS
Status: DISCONTINUED | OUTPATIENT
Start: 2023-01-01 | End: 2023-01-01

## 2023-01-01 RX ORDER — HALOPERIDOL 5 MG/ML
1 INJECTION INTRAMUSCULAR EVERY 4 HOURS PRN
Status: DISCONTINUED | OUTPATIENT
Start: 2023-01-01 | End: 2023-01-01 | Stop reason: HOSPADM

## 2023-01-01 RX ORDER — NOREPINEPHRINE BITARTRATE/D5W 4MG/250ML
PLASTIC BAG, INJECTION (ML) INTRAVENOUS
Status: COMPLETED
Start: 2023-01-01 | End: 2023-01-01

## 2023-01-01 RX ORDER — MORPHINE SULFATE 2 MG/ML
INJECTION, SOLUTION INTRAMUSCULAR; INTRAVENOUS
Status: COMPLETED
Start: 2023-01-01 | End: 2023-01-01

## 2023-01-01 RX ORDER — HYDROCODONE BITARTRATE AND ACETAMINOPHEN 500; 5 MG/1; MG/1
TABLET ORAL
Status: DISCONTINUED | OUTPATIENT
Start: 2023-01-01 | End: 2023-01-01

## 2023-01-01 RX ORDER — POTASSIUM CHLORIDE 750 MG/1
10 TABLET, EXTENDED RELEASE ORAL ONCE
COMMUNITY

## 2023-01-01 RX ORDER — MEMANTINE HYDROCHLORIDE 5 MG/1
5 TABLET ORAL 2 TIMES DAILY
COMMUNITY

## 2023-01-01 RX ORDER — FAMOTIDINE 10 MG/ML
20 INJECTION INTRAVENOUS DAILY
Status: DISCONTINUED | OUTPATIENT
Start: 2023-01-01 | End: 2023-01-01

## 2023-01-01 RX ORDER — FENTANYL CITRATE 50 UG/ML
50 INJECTION, SOLUTION INTRAMUSCULAR; INTRAVENOUS
Status: COMPLETED | OUTPATIENT
Start: 2023-01-01 | End: 2023-01-01

## 2023-01-01 RX ORDER — MORPHINE SULFATE 2 MG/ML
1 INJECTION, SOLUTION INTRAMUSCULAR; INTRAVENOUS ONCE
Status: COMPLETED | OUTPATIENT
Start: 2023-01-01 | End: 2023-01-01

## 2023-01-01 RX ORDER — NOREPINEPHRINE BITARTRATE/D5W 4MG/250ML
0-.2 PLASTIC BAG, INJECTION (ML) INTRAVENOUS CONTINUOUS
Status: DISCONTINUED | OUTPATIENT
Start: 2023-01-01 | End: 2023-01-01

## 2023-01-01 RX ORDER — METOPROLOL SUCCINATE 25 MG/1
TABLET, EXTENDED RELEASE ORAL
COMMUNITY

## 2023-01-01 RX ORDER — CELECOXIB 200 MG/1
200 CAPSULE ORAL
COMMUNITY

## 2023-01-01 RX ORDER — DOXEPIN HYDROCHLORIDE 25 MG/1
CAPSULE ORAL
COMMUNITY

## 2023-01-01 RX ORDER — AMLODIPINE BESYLATE 10 MG/1
10 TABLET ORAL
COMMUNITY

## 2023-01-01 RX ORDER — ALBUTEROL SULFATE 90 UG/1
AEROSOL, METERED RESPIRATORY (INHALATION)
COMMUNITY

## 2023-01-01 RX ORDER — INDOMETHACIN 25 MG/1
50 CAPSULE ORAL
Status: COMPLETED | OUTPATIENT
Start: 2023-01-01 | End: 2023-01-01

## 2023-01-01 RX ORDER — SODIUM CHLORIDE 0.9 % (FLUSH) 0.9 %
10 SYRINGE (ML) INJECTION
Status: CANCELLED | OUTPATIENT
Start: 2023-01-01

## 2023-01-01 RX ORDER — DEXMEDETOMIDINE HYDROCHLORIDE 4 UG/ML
0-1.4 INJECTION, SOLUTION INTRAVENOUS CONTINUOUS
Status: DISCONTINUED | OUTPATIENT
Start: 2023-01-01 | End: 2023-01-01

## 2023-01-01 RX ORDER — MUPIROCIN 20 MG/G
OINTMENT TOPICAL 2 TIMES DAILY
Status: DISCONTINUED | OUTPATIENT
Start: 2023-01-01 | End: 2023-01-01

## 2023-01-01 RX ORDER — SCOLOPAMINE TRANSDERMAL SYSTEM 1 MG/1
1 PATCH, EXTENDED RELEASE TRANSDERMAL
Status: DISCONTINUED | OUTPATIENT
Start: 2023-06-30 | End: 2023-01-01 | Stop reason: HOSPADM

## 2023-01-01 RX ORDER — HYDROMORPHONE HYDROCHLORIDE 1 MG/ML
0.2 INJECTION, SOLUTION INTRAMUSCULAR; INTRAVENOUS; SUBCUTANEOUS EVERY 4 HOURS PRN
Status: DISCONTINUED | OUTPATIENT
Start: 2023-01-01 | End: 2023-01-01

## 2023-01-01 RX ADMIN — HYDROMORPHONE HYDROCHLORIDE 0.5 MG: 1 INJECTION, SOLUTION INTRAMUSCULAR; INTRAVENOUS; SUBCUTANEOUS at 10:06

## 2023-01-01 RX ADMIN — VANCOMYCIN HYDROCHLORIDE 1000 MG: 1 INJECTION, POWDER, LYOPHILIZED, FOR SOLUTION INTRAVENOUS at 01:06

## 2023-01-01 RX ADMIN — CALCIUM GLUCONATE 1 G: 98 INJECTION, SOLUTION INTRAVENOUS at 08:06

## 2023-01-01 RX ADMIN — MORPHINE SULFATE 1 MG: 2 INJECTION, SOLUTION INTRAMUSCULAR; INTRAVENOUS at 06:06

## 2023-01-01 RX ADMIN — HYDROMORPHONE HYDROCHLORIDE 0.2 MG/HR: 10 INJECTION, SOLUTION INTRAMUSCULAR; INTRAVENOUS; SUBCUTANEOUS at 05:06

## 2023-01-01 RX ADMIN — SODIUM BICARBONATE: 84 INJECTION, SOLUTION INTRAVENOUS at 01:06

## 2023-01-01 RX ADMIN — SODIUM CHLORIDE 1000 ML: 0.9 INJECTION, SOLUTION INTRAVENOUS at 07:06

## 2023-01-01 RX ADMIN — CEFEPIME 1 G: 1 INJECTION, POWDER, FOR SOLUTION INTRAMUSCULAR; INTRAVENOUS at 04:06

## 2023-01-01 RX ADMIN — HYDROMORPHONE HYDROCHLORIDE 0.2 MG: 1 INJECTION, SOLUTION INTRAMUSCULAR; INTRAVENOUS; SUBCUTANEOUS at 08:06

## 2023-01-01 RX ADMIN — HALOPERIDOL LACTATE 1 MG: 5 INJECTION, SOLUTION INTRAMUSCULAR at 07:06

## 2023-01-01 RX ADMIN — DEXMEDETOMIDINE HYDROCHLORIDE 0.2 MCG/KG/HR: 4 INJECTION, SOLUTION INTRAVENOUS at 06:06

## 2023-01-01 RX ADMIN — PIPERACILLIN AND TAZOBACTAM 4.5 G: 4; .5 INJECTION, POWDER, LYOPHILIZED, FOR SOLUTION INTRAVENOUS; PARENTERAL at 09:06

## 2023-01-01 RX ADMIN — ACETYLCYSTEINE 2700 MG: 200 INJECTION, SOLUTION INTRAVENOUS at 05:06

## 2023-01-01 RX ADMIN — FAMOTIDINE 20 MG: 10 INJECTION, SOLUTION INTRAVENOUS at 02:06

## 2023-01-01 RX ADMIN — MUPIROCIN: 20 OINTMENT TOPICAL at 12:06

## 2023-01-01 RX ADMIN — HYDROMORPHONE HYDROCHLORIDE 0.5 MG: 1 INJECTION, SOLUTION INTRAMUSCULAR; INTRAVENOUS; SUBCUTANEOUS at 08:06

## 2023-01-01 RX ADMIN — INSULIN HUMAN 5 UNITS: 100 INJECTION, SOLUTION PARENTERAL at 01:06

## 2023-01-01 RX ADMIN — CALCIUM GLUCONATE 1 G: 20 INJECTION, SOLUTION INTRAVENOUS at 12:06

## 2023-01-01 RX ADMIN — ACETYLCYSTEINE 8200 MG: 200 INJECTION, SOLUTION INTRAVENOUS at 03:06

## 2023-01-01 RX ADMIN — FENTANYL CITRATE 50 MCG: 50 INJECTION INTRAMUSCULAR; INTRAVENOUS at 07:06

## 2023-01-01 RX ADMIN — SODIUM BICARBONATE 50 MEQ: 84 INJECTION, SOLUTION INTRAVENOUS at 07:06

## 2023-01-01 RX ADMIN — HYDROMORPHONE HYDROCHLORIDE 0.5 MG: 1 INJECTION, SOLUTION INTRAMUSCULAR; INTRAVENOUS; SUBCUTANEOUS at 01:06

## 2023-01-01 RX ADMIN — NOREPINEPHRINE BITARTRATE 0.02 MCG/KG/MIN: 4 INJECTION, SOLUTION INTRAVENOUS at 11:06

## 2023-01-01 RX ADMIN — DEXTROSE MONOHYDRATE 250 ML: 100 INJECTION, SOLUTION INTRAVENOUS at 12:06

## 2023-01-01 RX ADMIN — HALOPERIDOL LACTATE 1 MG: 5 INJECTION, SOLUTION INTRAMUSCULAR at 08:06

## 2023-01-01 RX ADMIN — Medication 4 MG: at 08:06

## 2023-01-01 RX ADMIN — NOREPINEPHRINE BITARTRATE 4 MG: 4 INJECTION, SOLUTION INTRAVENOUS at 08:06

## 2023-01-01 RX ADMIN — SCOPALAMINE 1 PATCH: 1 PATCH, EXTENDED RELEASE TRANSDERMAL at 01:06

## 2023-01-01 RX ADMIN — DEXTROSE MONOHYDRATE 250 ML: 100 INJECTION, SOLUTION INTRAVENOUS at 08:06

## 2023-01-01 RX ADMIN — HUMAN ALBUMIN MICROSPHERES AND PERFLUTREN 0.66 MG: 10; .22 INJECTION, SOLUTION INTRAVENOUS at 01:06

## 2023-01-01 RX ADMIN — HYDROMORPHONE HYDROCHLORIDE 0.5 MG: 1 INJECTION, SOLUTION INTRAMUSCULAR; INTRAVENOUS; SUBCUTANEOUS at 03:06

## 2023-01-01 RX ADMIN — CALCIUM GLUCONATE 1 G: 20 INJECTION, SOLUTION INTRAVENOUS at 07:06

## 2023-01-01 RX ADMIN — HYDROMORPHONE HYDROCHLORIDE 0.5 MG: 1 INJECTION, SOLUTION INTRAMUSCULAR; INTRAVENOUS; SUBCUTANEOUS at 07:06

## 2023-01-01 RX ADMIN — IOHEXOL 75 ML: 350 INJECTION, SOLUTION INTRAVENOUS at 10:06

## 2023-01-01 RX ADMIN — INSULIN HUMAN 5 UNITS: 100 INJECTION, SOLUTION PARENTERAL at 08:06

## 2023-06-13 PROBLEM — J30.2 SEASONAL ALLERGIC RHINITIS: Status: ACTIVE | Noted: 2023-01-01

## 2023-06-13 PROBLEM — R41.3 MEMORY IMPAIRMENT: Status: ACTIVE | Noted: 2023-01-01

## 2023-06-13 PROBLEM — E78.49 OTHER HYPERLIPIDEMIA: Status: ACTIVE | Noted: 2021-09-07

## 2023-06-13 PROBLEM — I10 HYPERTENSION: Status: ACTIVE | Noted: 2021-09-07

## 2023-06-13 PROBLEM — N30.00 ACUTE CYSTITIS WITHOUT HEMATURIA: Status: ACTIVE | Noted: 2021-09-07

## 2023-06-13 PROBLEM — M20.40 HAMMER TOE: Status: ACTIVE | Noted: 2023-01-01

## 2023-06-13 PROBLEM — D62 ACUTE BLOOD LOSS ANEMIA: Status: ACTIVE | Noted: 2021-09-07

## 2023-06-13 PROBLEM — I25.10 CORONARY ATHEROSCLEROSIS: Status: ACTIVE | Noted: 2023-01-01

## 2023-06-13 PROBLEM — L03.119 CELLULITIS OF UPPER EXTREMITY: Status: ACTIVE | Noted: 2022-01-07

## 2023-06-13 PROBLEM — L98.9 SKIN PROBLEM: Status: ACTIVE | Noted: 2022-01-07

## 2023-06-13 PROBLEM — S72.8X2A OTHER FRACTURE OF LEFT FEMUR, INITIAL ENCOUNTER FOR CLOSED FRACTURE: Status: ACTIVE | Noted: 2021-09-07

## 2023-06-13 PROBLEM — K21.9 GASTROESOPHAGEAL REFLUX DISEASE WITHOUT ESOPHAGITIS: Status: ACTIVE | Noted: 2021-09-07

## 2023-06-13 NOTE — ED TRIAGE NOTES
Pt presents to the ED for AMS. Family reports a decline from baseline. Pt states she fell today and hit the back of her head.

## 2023-06-13 NOTE — ED PROVIDER NOTES
"Encounter Date: 6/13/2023       History     Chief Complaint   Patient presents with    Multiple complaints     Hx dementia daughter reports declining over 3 d, 3 er visits in past weeks head injuries, not able to care for self,      76 yo F with hx of CABG, HTN, dementia presents to the ED with frequent falls and altered mental status. Pt's son is present and reports that the patient has been having "psychotic episodes" over the last 1.5 wks.  He reports that she will roll on the ground screaming in the yard. She once told the son that she would "bite his penis off".  He also reports numerous falls with head trauma over about the same duration.  Son does report that the patient has had intermittent similar episodes sporadically over the past several years.  She has had many falls with head trauma over the past decade.  He feels that the symptoms have worsened acutely in the past 1.5 weeks.  Pt does admit to not feeling right in her head. She does not recall the episodes that the son is referring to.  She denies any significant weakness, difficulty breathing, chest pain, changes in urination, fevers, headaches, vomiting or other acute complaints. Son reports several ED visits over the past week for falls, trauma, changes in behavior.  He states that these are small hospitals and they only monitor the patient for a few hours and then discharge her.  He is not clear on what tests were done during these evaluations. He is currently in the process of getting the patient admitted to a long-term care facility in [place], [place]. She has been living alone in [place], [place]. They presented to [facility] today as the patient is currently staying with the son who owns a home in [place].      Review of patient's allergies indicates:  No Known Allergies  History reviewed. No pertinent past medical history.  History reviewed. No pertinent surgical history.  History reviewed. No pertinent family history.     Review of Systems "   Psychiatric/Behavioral:  Positive for behavioral problems.      Physical Exam     Initial Vitals [06/13/23 1630]   BP Pulse Resp Temp SpO2   (!) 174/101 92 18 98.1 °F (36.7 °C) 96 %      MAP       --         Physical Exam    Nursing note and vitals reviewed.  Constitutional: She appears well-developed and well-nourished. She is not diaphoretic.  Non-toxic appearance. She does not appear ill. No distress.   HENT:   Head: Normocephalic.   Large area of ecchymosis to the posterior scalp    Neck: Neck supple.   Cardiovascular:  Normal rate and regular rhythm.     Exam reveals no gallop and no friction rub.       No murmur heard.  Pulmonary/Chest: Effort normal and breath sounds normal. No accessory muscle usage. No tachypnea. No respiratory distress. She has no decreased breath sounds. She has no wheezes. She has no rhonchi. She has no rales.   Abdominal: She exhibits no distension.   Musculoskeletal:      Cervical back: Neck supple.      Comments: No midline C, T, L spinous process tenderness.      Neurological: She is alert. She has normal strength. No sensory deficit.   Patient is able to answer questions appropriately.  Speech is clear and fluent.  No facial droop or asymmetry.    No extremity drift.   Skin: No rash noted.   Psychiatric: She has a normal mood and affect. Her speech is normal and behavior is normal.   Good eye contact. Repeating questions multiple times.        ED Course   Procedures  Labs Reviewed   CBC W/ AUTO DIFFERENTIAL - Abnormal; Notable for the following components:       Result Value    Hemoglobin 11.5 (*)     Hematocrit 36.3 (*)     MCH 26.4 (*)     MCHC 31.7 (*)     RDW 17.0 (*)     All other components within normal limits   COMPREHENSIVE METABOLIC PANEL - Abnormal; Notable for the following components:    CO2 20 (*)     All other components within normal limits   URINALYSIS, REFLEX TO URINE CULTURE - Abnormal; Notable for the following components:    Appearance, UA Hazy (*)      Protein, UA 2+ (*)     Leukocytes, UA Trace (*)     All other components within normal limits    Narrative:     Specimen Source->Urine   URINALYSIS MICROSCOPIC - Abnormal; Notable for the following components:    WBC, UA 6 (*)     Bacteria Many (*)     Hyaline Casts, UA 11 (*)     All other components within normal limits    Narrative:     Specimen Source->Urine   HIV 1 / 2 ANTIBODY    Narrative:     Release to patient->Immediate   HEPATITIS C ANTIBODY    Narrative:     Release to patient->Immediate   TSH   DRUG SCREEN PANEL, URINE EMERGENCY    Narrative:     Specimen Source->Urine   ALCOHOL,MEDICAL (ETHANOL)          Imaging Results              X-Ray Hips Bilateral 2 View Incl AP Pelvis (Final result)  Result time 06/13/23 21:00:38      Final result by Asa Caldera MD (06/13/23 21:00:38)                   Impression:      No acute displaced fracture-dislocation identified.    Left hip total arthroplasty.      Electronically signed by: Asa Caldera MD  Date:    06/13/2023  Time:    21:00               Narrative:    EXAMINATION:  XR HIPS BILATERAL 2 VIEW INCL AP PELVIS    CLINICAL HISTORY:  Repeated falls    TECHNIQUE:  AP view of the pelvis and frogleg lateral views of both hips were performed.    COMPARISON:  None.    FINDINGS:  Generalized osteopenia.  Left hip total arthroplasty demonstrates anatomic positioning and alignment.  No evidence of acute hardware malalignment or loosening.  No acute displaced fracture, dislocation or destructive osseous process.  Age-related degenerative change at the imaged lower lumbar spine, pubic symphysis, right hip and bilateral SI joints.  Pelvic phleboliths and scattered atherosclerotic vascular calcifications noted.  No subcutaneous emphysema or radiodense retained foreign body.                                       X-Ray Chest AP Portable (Final result)  Result time 06/13/23 20:46:23      Final result by Andre Lewis MD (06/13/23 20:46:23)                    Impression:      No acute findings in the chest.    Mildly enlarged cardiomediastinal silhouette.  Sternotomy wires present.      Electronically signed by: Andre Lewis MD  Date:    06/13/2023  Time:    20:46               Narrative:    EXAMINATION:  XR CHEST AP PORTABLE    CLINICAL HISTORY:  Altered mental status, unspecified    TECHNIQUE:  Single frontal view of the chest was performed.    COMPARISON:  05/20/2010.    FINDINGS:  Sternotomy wires present.  Mediastinal clips present.    No consolidation, pleural effusion or pneumothorax.    Cardiomediastinal silhouette is mildly enlarged.  Calcification in the aortic knob.                                       CT Head Without Contrast (Final result)  Result time 06/13/23 19:09:49      Final result by Kings Farley MD (06/13/23 19:09:49)                   Impression:      1. No acute intracranial process.  2. Involutional changes with chronic microvascular ischemic changes.  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  3. See above comments also.      Electronically signed by: Kings Farley  Date:    06/13/2023  Time:    19:09               Narrative:    EXAMINATION:  CT HEAD WITHOUT CONTRAST    CLINICAL HISTORY:  Head trauma, minor (Age >= 65y);    TECHNIQUE:  Low dose axial CT images obtained throughout the head without intravenous contrast. Sagittal and coronal reconstructions were performed.    COMPARISON:  07/08/2011    FINDINGS:  Intracranial compartment:    No midline shift or hydrocephalus.  No extra-axial blood or fluid collections.    Moderate involutional changes and chronic microvascular ischemic changes in the periventricular white matter.    Remote inferior left frontal cortical infarct with residual encephalomalacia.    Remote lacunar infarct of the left caudate.    Remote lacunar infarct of the right superior and inferior cerebellum    Bilateral senescent basal ganglia  calcifications.    Stable midline 7 mm fatty focus at the posterior margin of the optic chiasm and suprasellar cistern.    No parenchymal mass, hemorrhage, edema or major vascular distribution infarct.    Skull/extracranial contents (limited evaluation): No fracture. Mastoid air cells and paranasal sinuses are essentially clear.                                       CT Cervical Spine Without Contrast (Final result)  Result time 06/13/23 19:39:59      Final result by Kings Farley MD (06/13/23 19:39:59)                   Impression:      1. No acute abnormality.  See above comments.  2. Multilevel chronic degenerative changes.  See above comments.      Electronically signed by: Kings Farley  Date:    06/13/2023  Time:    19:39               Narrative:    EXAMINATION:  CT CERVICAL SPINE WITHOUT CONTRAST    CLINICAL HISTORY:  Neck trauma (Age >= 65y);    TECHNIQUE:  Low dose axial CT images through the cervical spine, with sagittal and coronal reformations.  Contrast was not administered.    COMPARISON:  05/13/2010    FINDINGS:  No acute fractures of the cervical spine.  Probable mild chronic compression fracture of the superior endplate of T1 with mild anterior wedging.  No comminution or retropulsion.  MRI may better characterize if there is high clinical suspicion.  This is new from the 2010 study.    Minimal spondylolisthesis of C3 on C4.  Mild grade 1 retrolisthesis of C5 on C6.    Moderate disc space narrowing at C5-6 and C6-7.  Mild disc space narrowing elsewhere.    Degenerative endplate changes are noted at C2-3, C4-5, C6-7 and T2-3.    Mild facet arthropathy at C3-4 on the left.    Mild central disc protrusion at C three-four and to a slightly lesser degree C2-3.    Mild diffuse posterior disc osteophyte complex at C4-5, C5-6 and C6-7.    No significant central canal narrowing.    Severe foraminal narrowing at C3-4 bilaterally, C4-5 bilaterally, C5-6 bilaterally, C6-7 bilaterally.    Limited evaluation of  the intraspinal contents demonstrates no hematoma or mass.Paraspinal soft tissues exhibit no acute abnormalities.                                       Medications - No data to display  Medical Decision Making:   History:   Old Medical Records: I decided to obtain old medical records.  Initial Assessment:   77-year-old female presents to the ED with behavioral changes and frequent falls over the past several days per son.  Hypertensive but vitals otherwise within normal limits.  Afebrile.  Patient is alert, engaging and answering questions appropriately. She does repeat questions multiple times after being answered  Differential Diagnosis:   My differential diagnosis includes but is not limited to:  Dementia, delirium, electrolyte abnormality, psychosis, encephalopathy, UTI  Clinical Tests:   Lab Tests: Ordered  Radiological Study: Ordered  ED Management:  No concerning lab abnormalities.  CT and x-ray imaging showed no acute process/injury.  Patient did not appear psychotic and remained calm throughout ED evaluation.  Discussed potential treatment plan and disposition with patient's son.  He is concerned about the intermittent episodes of bizarre and aggressive behavior and understands that patient is not currently exhibiting these symptoms.  I agreed to discuss with psychiatry which I have done.  They will evaluate the patient and provide further recommendations.  Patient signed out to oncoming GER[facility]D pending full psychiatric evaluation and recommendations and until final disposition is reached.  I have reviewed the patient's records and discussed this case with my supervising physician.       Other:   I have discussed this case with another health care provider.       <> Summary of the Discussion: Psychiatry                        Clinical Impression:   Final diagnoses:  [R41.82] Altered mental status  [R29.6] Frequent falls (Primary)        ED Disposition Condition    Discharge Stable          ED Prescriptions     None       Follow-up Information       Follow up With Specialties Details Why Contact Info Additional Information    [facility] Physical Therapy Schedule an appointment as soon as possible for a visit in 1 week  [address]  [facility] [address] [phone]     [facility] Bl Internal Medicine Schedule an appointment as soon as possible for a visit in 1 week  [address]  [facility] [id number] [phone] [facility] Please park in surface lot and check in at central registration desk    [facility] - Emergency Dept Emergency Medicine Go to  If symptoms worsen [address]  [facility] [address] [phone]              Sharda Munoz PA-C  06/14/23 1055

## 2023-06-13 NOTE — LETTER
June 13, 2023    Dom Yates  [address]  [place] [address]                   [address] [facility] [address]  Phone: [phone]  Fax: [phone]   To whom it may concern:    Ms. Dom Yates was seen in the emergency department for a fall with waxing and waning altered mental status.  She has had frequent falls recently and I think that she could benefit from long-term care for her safety.      Sincerely,        Katty Arrgeuin PA-C

## 2023-06-14 NOTE — PROVIDER PROGRESS NOTES - EMERGENCY DEPT.
Encounter Date: 6/13/2023    ED Physician Progress Notes          ED Physician Hand-off Note:    ED Course: I assumed care of patient from off-going ED physician team. Briefly, Patient is a 77-year-old female presenting for frequent falls with waxing and waning confusion.    At the time of signout plan was pending psychiatry evaluation.    Medications given in the ED:    Medications - No data to display    Patient seen and evaluated by Psychiatry.  They do not recommend geriatric psychiatry admission at this time.  I spoke with the patient's son, he arrangements to place her in a long-term care facility.  He is requesting reports of her chest x-ray and recommendation for long-term care which I provided.  Instructed to follow up with primary care, referral provided to Internal Medicine and return to the ED for worsening symptoms. Stressed the importance of follow-up, strict ED return precautions given.  Patient voiced understanding and is comfortable with discharge. I discussed this patient with my supervising physician.

## 2023-06-14 NOTE — CONSULTS
"CONSULTATION LIAISON PSYCHIATRY INITIAL EVALUATION    Patient Name: Dom Yates  MRN: [id number]  Patient Class: Emergency  Admission Date: 6/13/2023  Attending Physician: Catherine Juarez MD      HPI:   Dom Yates is a 77 y.o. female with past psychiatric history of dementia (unspecified) & past pertinent medical history of CABG, HTN, and several recent falls presents to the ED for worsening of her dementia symptoms, including confusion and agitation.    Psychiatry consulted for "episodes of bizarre and aggressive behavior"    Per ED provider, pt was BIB son after becoming acutely agitated at home and yelling obscene threats.     Patient was seen in the ED with her son Darrion Yates ([phone]) at bedside. Son reports she has a history of dementia for which she has taken Namenda in the past. He is unsure of her recent compliance with it. He denied any history of inpatient or outpatient psychiatric care, or any other psychotropic medications besides Namenda. He says she usually lives alone in [facility] and is checked on by other friends/family but they have been more concerned with her ability to care for herself lately, so the patient has started the process of getting her into shelter care at [facility] in [place]. He believes she is about a week from being accepted and moving in there, however over the past week she has had several ED visits due to frequent falls. He brought her from [place] to [place] ~2 days ago and has noticed her becoming intermittently confused and off balance, then becoming agitated when he and his wife try to help her. This resulted in one threatening comment but she has not actually become violent. Medical workups during ED visits have been negative. They report intermittent adherence to walking with rolling walker at home. Denied that she has been depressed, paranoid, hallucinating, not sleeping/eating, or getting intentionally violent with them. Discussed " safety and delirium precautions around the home.    On psych exam, pt had euthymic affect, was alert and attentive to conversation, but frequently endorsed confusion. She had insight into her diagnosis of dementia but could not remember whether she has been taking medication for it. She was very concerned with how long she would be in the ED and frequently repeated the same questions about expected course, but was redirectable. She was oriented to hospital, city, month, and year. She denied SI, HI, AVH, insomnia, poor appetite. She did admit to worsening confusion and memory loss lately. She seemed to be aware of plan to move her into the facility in [place]. She was calm throughout interview and requested help with blankets,       Medical Review of Systems:  MEDICAL ROS    Complete review of systems performed covering Constitutional, Eyes, ENT/Mouth, Cardiovascular, Respiratory, Gastrointestinal, Genitourinary, Musculoskeletal, Skin, Neurologic, Endocrine, Heme/Lymph, and Allergy/Immune.     Complete review of systems was negative with the exception of the following positive symptoms: frequent falls      Psychiatric Review of Systems (is patient experiencing or having changes in):  sleep: no  appetite: no  weight: no  energy/anergy: no  interest/pleasure/anhedonia: no  somatic symptoms: no  libido: no  anxiety/panic: yes  guilty/hopelessness: no  concentration: no  Lola:no  Psychosis: no  Trauma: no  S.I.B.s/risky behavior: no    Past Psychiatric History:  Previous Medication Trials: yes, Namenda  Previous Psychiatric Hospitalizations:no   Previous Suicide Attempts: no  History of Violence: no  Outpatient Psychiatrist: no  Family Psychiatric History: no    Substance Abuse History (with emphasis over the last 12 months):  Recreational Drugs:  denied, UDS neg  Use of Alcohol: denied  Tobacco Use:not asked  Rehab History:no    Social History:  Marital Status:   Children: 1  Employment Status/Info:  "retired  :no  Education:  not asked  Special Ed: not asked  Housing Status: alone  Access to gun: no  Psychosocial Stressors: health  Functioning Relationships: good relationship with children    Legal History:  Past Charges/Incarcerations: no  Pending charges:no    Mental Status Exam:  General Appearance: appears stated age, well developed and nourished, adequately groomed and appropriately dressed, in no acute distress. Pt did have multiple bruises and scrapes in various stages of healing to BLE/BUE  Behavior: normal; cooperative; reasonably friendly, pleasant, and polite; appropriate eye-contact; under good behavioral control  Involuntary Movements and Motor Activity: no abnormal involuntary movements noted; no tics, no tremors, no akathisia, no dystonia, no evidence of tardive dyskinesia; no psychomotor agitation or retardation  Gait and Station: unable to assess - patient lying down or seated  Speech and Language: normal rate, rhythm, volume, tone, and pitch  Mood: "worried"  Affect: normal, euthymic, reactive, full-range, mood-congruent, appropriate to situation and context  Thought Process and Associations: circumstantial, scattered  Thought Content and Perceptions:: no suicidal or homicidal ideation, no auditory or visual hallucinations, no paranoid ideation, no ideas of reference, no evidence of delusions or psychosis  Sensorium and Orientation: intact; alert with clear sensorium; oriented fully to person, place, time and situation  Recent and Remote Memory: recent memory impaired, remote memory impaired  Attention and Concentration: grossly intact, attentive to the conversation and not readily distractible  Fund of Knowledge: grossly intact, used appropriate vocabulary and demonstrated an awareness of current events, consistent with educational level achieved  Insight: intact, demonstrates awareness of illness and situation  Judgment: compliant with health provider's recommendations and " instructions, reportedly having episodic lapses in judgment consistent with dementia-related behavioral disturbances    CAM ICU positive? no      ASSESSMENT & RECOMMENDATIONS   Dementia, with recent progression  Continue [facility] per established outpatient providers in [place]  Agree with plan to transition her to [facility] care ASAP for safety  Do not recommend geriatric psychiatric placement as patient does not appear to be gravely disabled or imminent threat to self/others  Safety and return precautions per ED    RISK ASSESSMENT  NO NEED FOR PEC patient NOT in any imminent danger of hurting self or others and not gravely disabled.     FOLLOW UP  Will follow up while in house  Will sign off. Patient can follow up with established outpatient health providers in [place] where she lives.    DISPOSITION - once medically cleared:   Patient may be discharged home with plan to transition to [facility] care ASAP. Family provided with ED return precautions.    Please contact ON CALL psychiatry service (24/7) for any acute issues that may arise.    Dr. Marta BAIN Psychiatry  [facility]  6/13/2023 11:18 PM        --------------------------------------------------------------------------------------------------------------------------------------------------------------------------------------------------------------------------------------    CONTINUED HISTORY & OBJECTIVE clinical data & findings reviewed and noted for above decision making    Past Medical/Surgical History:   History reviewed. No pertinent past medical history.  History reviewed. No pertinent surgical history.    Current Medications:   Scheduled Meds:   PRN Meds:   OTC Meds:     Allergies:   Review of patient's allergies indicates:  No Known Allergies    Vitals  Vitals:    06/13/23 2202   BP: (!) 173/98   Pulse:    Resp:    Temp:        Labs/Imaging/Studies:  Recent Results (from the past 24 hour(s))   HIV 1/2 Ag/Ab (4th Gen)     Collection Time: 06/13/23  6:22 PM   Result Value Ref Range    HIV 1/2 Ag/Ab Non-reactive Non-reactive   Hepatitis C Antibody    Collection Time: 06/13/23  6:22 PM   Result Value Ref Range    Hepatitis C Ab Non-reactive Non-reactive   CBC Auto Differential    Collection Time: 06/13/23  6:22 PM   Result Value Ref Range    WBC 8.10 3.90 - 12.70 K/uL    RBC 4.35 4.00 - 5.40 M/uL    Hemoglobin 11.5 (L) 12.0 - 16.0 g/dL    Hematocrit 36.3 (L) 37.0 - 48.5 %    MCV 83 82 - 98 fL    MCH 26.4 (L) 27.0 - 31.0 pg    MCHC 31.7 (L) 32.0 - 36.0 g/dL    RDW 17.0 (H) 11.5 - 14.5 %    Platelets 308 150 - 450 K/uL    MPV 9.6 9.2 - 12.9 fL    Immature Granulocytes 0.5 0.0 - 0.5 %    Gran # (ANC) 5.4 1.8 - 7.7 K/uL    Immature Grans (Abs) 0.04 0.00 - 0.04 K/uL    Lymph # 1.6 1.0 - 4.8 K/uL    Mono # 1.0 0.3 - 1.0 K/uL    Eos # 0.1 0.0 - 0.5 K/uL    Baso # 0.02 0.00 - 0.20 K/uL    nRBC 0 0 /100 WBC    Gran % 66.3 38.0 - 73.0 %    Lymph % 19.8 18.0 - 48.0 %    Mono % 12.5 4.0 - 15.0 %    Eosinophil % 0.7 0.0 - 8.0 %    Basophil % 0.2 0.0 - 1.9 %    Differential Method Automated    Comprehensive Metabolic Panel    Collection Time: 06/13/23  6:22 PM   Result Value Ref Range    Sodium 139 136 - 145 mmol/L    Potassium 3.5 3.5 - 5.1 mmol/L    Chloride 106 95 - 110 mmol/L    CO2 20 (L) 23 - 29 mmol/L    Glucose 91 70 - 110 mg/dL    BUN 23 8 - 23 mg/dL    Creatinine 0.8 0.5 - 1.4 mg/dL    Calcium 8.8 8.7 - 10.5 mg/dL    Total Protein 6.4 6.0 - 8.4 g/dL    Albumin 3.5 3.5 - 5.2 g/dL    Total Bilirubin 0.6 0.1 - 1.0 mg/dL    Alkaline Phosphatase 112 55 - 135 U/L    AST 21 10 - 40 U/L    ALT 20 10 - 44 U/L    Anion Gap 13 8 - 16 mmol/L    eGFR >60.0 >60 mL/min/1.73 m^2   TSH    Collection Time: 06/13/23  6:22 PM   Result Value Ref Range    TSH 2.048 0.400 - 4.000 uIU/mL   Ethanol    Collection Time: 06/13/23  6:22 PM   Result Value Ref Range    Alcohol, Serum <10 <10 mg/dL   Urinalysis, Reflex to Urine Culture Urine, Clean Catch    Collection  Time: 06/13/23  7:20 PM    Specimen: Urine   Result Value Ref Range    Specimen UA Urine, Clean Catch     Color, UA Yellow Yellow, Straw, Rosio    Appearance, UA Hazy (A) Clear    pH, UA 6.0 5.0 - 8.0    Specific Gravity, UA 1.015 1.005 - 1.030    Protein, UA 2+ (A) Negative    Glucose, UA Negative Negative    Ketones, UA Negative Negative    Bilirubin (UA) Negative Negative    Occult Blood UA Negative Negative    Nitrite, UA Negative Negative    Leukocytes, UA Trace (A) Negative   Drug screen panel, emergency    Collection Time: 06/13/23  7:20 PM   Result Value Ref Range    Benzodiazepines Negative Negative    Methadone metabolites Negative Negative    Cocaine (Metab.) Negative Negative    Opiate Scrn, Ur Negative Negative    Barbiturate Screen, Ur Negative Negative    Amphetamine Screen, Ur Negative Negative    THC Negative Negative    Phencyclidine Negative Negative    Creatinine, Urine 67.0 15.0 - 325.0 mg/dL    Toxicology Information SEE COMMENT    Urinalysis Microscopic    Collection Time: 06/13/23  7:20 PM   Result Value Ref Range    RBC, UA 1 0 - 4 /hpf    WBC, UA 6 (H) 0 - 5 /hpf    Bacteria Many (A) None-Occ /hpf    Squam Epithel, UA 1 /hpf    Hyaline Casts, UA 11 (A) 0-1/lpf /lpf    Microscopic Comment SEE COMMENT      Imaging Results              X-Ray Hips Bilateral 2 View Incl AP Pelvis (Final result)  Result time 06/13/23 21:00:38      Final result by Asa Caldera MD (06/13/23 21:00:38)                   Impression:      No acute displaced fracture-dislocation identified.    Left hip total arthroplasty.      Electronically signed by: Asa Caldera MD  Date:    06/13/2023  Time:    21:00               Narrative:    EXAMINATION:  XR HIPS BILATERAL 2 VIEW INCL AP PELVIS    CLINICAL HISTORY:  Repeated falls    TECHNIQUE:  AP view of the pelvis and frogleg lateral views of both hips were performed.    COMPARISON:  None.    FINDINGS:  Generalized osteopenia.  Left hip total arthroplasty demonstrates  anatomic positioning and alignment.  No evidence of acute hardware malalignment or loosening.  No acute displaced fracture, dislocation or destructive osseous process.  Age-related degenerative change at the imaged lower lumbar spine, pubic symphysis, right hip and bilateral SI joints.  Pelvic phleboliths and scattered atherosclerotic vascular calcifications noted.  No subcutaneous emphysema or radiodense retained foreign body.                                       X-Ray Chest AP Portable (Final result)  Result time 06/13/23 20:46:23      Final result by Andre Lewis MD (06/13/23 20:46:23)                   Impression:      No acute findings in the chest.    Mildly enlarged cardiomediastinal silhouette.  Sternotomy wires present.      Electronically signed by: Andre Lewis MD  Date:    06/13/2023  Time:    20:46               Narrative:    EXAMINATION:  XR CHEST AP PORTABLE    CLINICAL HISTORY:  Altered mental status, unspecified    TECHNIQUE:  Single frontal view of the chest was performed.    COMPARISON:  05/20/2010.    FINDINGS:  Sternotomy wires present.  Mediastinal clips present.    No consolidation, pleural effusion or pneumothorax.    Cardiomediastinal silhouette is mildly enlarged.  Calcification in the aortic knob.                                       CT Head Without Contrast (Final result)  Result time 06/13/23 19:09:49      Final result by Kings Farley MD (06/13/23 19:09:49)                   Impression:      1. No acute intracranial process.  2. Involutional changes with chronic microvascular ischemic changes.  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  3. See above comments also.      Electronically signed by: Kings Farley  Date:    06/13/2023  Time:    19:09               Narrative:    EXAMINATION:  CT HEAD WITHOUT CONTRAST    CLINICAL HISTORY:  Head trauma, minor (Age >= 65y);    TECHNIQUE:  Low dose axial  CT images obtained throughout the head without intravenous contrast. Sagittal and coronal reconstructions were performed.    COMPARISON:  07/08/2011    FINDINGS:  Intracranial compartment:    No midline shift or hydrocephalus.  No extra-axial blood or fluid collections.    Moderate involutional changes and chronic microvascular ischemic changes in the periventricular white matter.    Remote inferior left frontal cortical infarct with residual encephalomalacia.    Remote lacunar infarct of the left caudate.    Remote lacunar infarct of the right superior and inferior cerebellum    Bilateral senescent basal ganglia calcifications.    Stable midline 7 mm fatty focus at the posterior margin of the optic chiasm and suprasellar cistern.    No parenchymal mass, hemorrhage, edema or major vascular distribution infarct.    Skull/extracranial contents (limited evaluation): No fracture. Mastoid air cells and paranasal sinuses are essentially clear.                                       CT Cervical Spine Without Contrast (Final result)  Result time 06/13/23 19:39:59      Final result by Kings Farley MD (06/13/23 19:39:59)                   Impression:      1. No acute abnormality.  See above comments.  2. Multilevel chronic degenerative changes.  See above comments.      Electronically signed by: Kings Farley  Date:    06/13/2023  Time:    19:39               Narrative:    EXAMINATION:  CT CERVICAL SPINE WITHOUT CONTRAST    CLINICAL HISTORY:  Neck trauma (Age >= 65y);    TECHNIQUE:  Low dose axial CT images through the cervical spine, with sagittal and coronal reformations.  Contrast was not administered.    COMPARISON:  05/13/2010    FINDINGS:  No acute fractures of the cervical spine.  Probable mild chronic compression fracture of the superior endplate of T1 with mild anterior wedging.  No comminution or retropulsion.  MRI may better characterize if there is high clinical suspicion.  This is new from the 2010  study.    Minimal spondylolisthesis of C3 on C4.  Mild grade 1 retrolisthesis of C5 on C6.    Moderate disc space narrowing at C5-6 and C6-7.  Mild disc space narrowing elsewhere.    Degenerative endplate changes are noted at C2-3, C4-5, C6-7 and T2-3.    Mild facet arthropathy at C3-4 on the left.    Mild central disc protrusion at C three-four and to a slightly lesser degree C2-3.    Mild diffuse posterior disc osteophyte complex at C4-5, C5-6 and C6-7.    No significant central canal narrowing.    Severe foraminal narrowing at C3-4 bilaterally, C4-5 bilaterally, C5-6 bilaterally, C6-7 bilaterally.    Limited evaluation of the intraspinal contents demonstrates no hematoma or mass.Paraspinal soft tissues exhibit no acute abnormalities.

## 2023-06-14 NOTE — ED TRIAGE NOTES
Dom Yates, a 77 y.o. female presents to the ED w/ complaint of fall. Pt reports falling over the past couple days multiple times.    Adult Physical Assessment  LOC: Dom Yates, 77 y.o. female verified via two identifiers.  The patient is awake, alert, oriented and speaking appropriately at this time.  APPEARANCE: Patient resting comfortably and appears to be in no acute distress at this time. Patient is clean and well groomed, patient's clothing is properly fastened.  SKIN:The skin is warm and dry, color consistent with ethnicity, patient has normal skin turgor and moist mucPt reports falling over the past couple days multiple times.us membranes, skin intact, no breakdown or brusing noted.  MUSCULOSKELETAL: Patient moving all extremities well, no obvious swelling or deformities noted.   RESPIRATORY: Airway is open and patent, respirations are spontaneous, patient has a normal effort and rate, no accessory muscle use noted.  CARDIAC: Patient has a normal rate and rhythm, no periphreal edema noted in any extremity, capillary refill < 3 seconds in all extremities  ABDOMEN: Soft and non tender to palpation, no abdominal distention noted. Bowel sounds present in all four quadrants.  NEUROLOGIC: Eyes open spontaneously, behavior appropriate to situation, follows commands, facial expression symmetrical, bilateral hand grasp equal and even, purposeful motor response noted, normal sensation in all extremities when touched with a finger.      Triage note:  Chief Complaint   Patient presents with    Multiple complaints     Hx dementia daughter reports declining over 3 d, 3 er visits in past weeks head injuries, not able to care for self,      Review of patient's allergies indicates:  Not on File  No past medical history on file.

## 2023-06-14 NOTE — DISCHARGE INSTRUCTIONS
You were seen in the emergency department for frequent falls and confusion.  Your lab tests and imaging did not show any concerning findings.    I sent referrals to our physical therapy and Internal Medicine Clinic.  Please call to schedule follow-up appointments.  Please follow-up with the long-term care facility in [place] to help establish care.  If you start to have any worsening symptoms, please return to the emergency department.

## 2023-06-26 PROBLEM — E87.20 METABOLIC ACIDOSIS: Status: ACTIVE | Noted: 2023-01-01

## 2023-06-26 PROBLEM — R57.9 SHOCK, UNSPECIFIED: Status: ACTIVE | Noted: 2023-01-01

## 2023-06-26 PROBLEM — Z51.5 COMFORT MEASURES ONLY STATUS: Status: ACTIVE | Noted: 2023-01-01

## 2023-06-26 PROBLEM — A41.9 SEVERE SEPSIS: Status: ACTIVE | Noted: 2023-01-01

## 2023-06-26 PROBLEM — I51.3 LEFT VENTRICULAR THROMBUS: Status: ACTIVE | Noted: 2023-01-01

## 2023-06-26 PROBLEM — I50.41 ACUTE COMBINED SYSTOLIC AND DIASTOLIC HEART FAILURE: Status: ACTIVE | Noted: 2023-01-01

## 2023-06-26 PROBLEM — R74.01 TRANSAMINITIS: Status: ACTIVE | Noted: 2023-01-01

## 2023-06-26 PROBLEM — F03.90 DEMENTIA: Status: ACTIVE | Noted: 2023-01-01

## 2023-06-26 PROBLEM — S01.512A TONGUE LACERATION: Status: ACTIVE | Noted: 2023-01-01

## 2023-06-26 PROBLEM — Z71.89 GOALS OF CARE, COUNSELING/DISCUSSION: Status: ACTIVE | Noted: 2023-01-01

## 2023-06-26 PROBLEM — N17.9 AKI (ACUTE KIDNEY INJURY): Status: ACTIVE | Noted: 2023-01-01

## 2023-06-26 PROBLEM — G93.40 ACUTE ENCEPHALOPATHY: Status: ACTIVE | Noted: 2023-01-01

## 2023-06-26 PROBLEM — E87.5 HYPERKALEMIA: Status: ACTIVE | Noted: 2023-01-01

## 2023-06-26 PROBLEM — R65.20 SEVERE SEPSIS: Status: ACTIVE | Noted: 2023-01-01

## 2023-06-26 NOTE — SUBJECTIVE & OBJECTIVE
Past Medical History:   Diagnosis Date    Dementia        History reviewed. No pertinent surgical history.    Review of patient's allergies indicates:  No Known Allergies    Family History    None       Tobacco Use    Smoking status: Not on file    Smokeless tobacco: Not on file   Substance and Sexual Activity    Alcohol use: Not on file    Drug use: Not on file    Sexual activity: Not on file      Review of Systems   Unable to perform ROS: Dementia   Objective:     Vital Signs (Most Recent):  Temp: (!) 93.9 °F (34.4 °C) (06/26/23 1210)  Pulse: (!) 57 (06/26/23 1210)  Resp: (!) 21 (06/26/23 1210)  BP: (!) 158/79 (06/26/23 1210)  SpO2: 100 % (06/26/23 1210) Vital Signs (24h Range):  Temp:  [91.1 °F (32.8 °C)-93.9 °F (34.4 °C)] 93.9 °F (34.4 °C)  Pulse:  [47-79] 57  Resp:  [20-28] 21  SpO2:  [78 %-100 %] 100 %  BP: (140-212)/() 158/79   Weight: 54.4 kg (120 lb)  Body mass index is 22.31 kg/m².      Intake/Output Summary (Last 24 hours) at 6/26/2023 1253  Last data filed at 6/26/2023 0954  Gross per 24 hour   Intake 1100 ml   Output --   Net 1100 ml          Physical Exam  Constitutional:       Appearance: She is ill-appearing.      Comments: Hypothermic; mottled   HENT:      Head: Normocephalic and atraumatic.      Mouth/Throat:      Comments: Laceration and maceration to distal tongue  Cardiovascular:      Rate and Rhythm: Bradycardia present.      Heart sounds: S1 normal and S2 normal. No murmur heard.  Pulmonary:      Effort: Pulmonary effort is normal. No tachypnea or respiratory distress.      Breath sounds: Normal breath sounds. No decreased breath sounds, wheezing, rhonchi or rales.   Abdominal:      General: Abdomen is protuberant.      Palpations: Abdomen is soft.      Tenderness: There is no abdominal tenderness. There is no guarding or rebound.   Musculoskeletal:      Cervical back: Neck supple.   Skin:     Coloration: Skin is mottled.   Neurological:      GCS: GCS eye subscore is 4. GCS verbal  subscore is 4. GCS motor subscore is 6.          Vents:     Lines/Drains/Airways       Drain  Duration                  Urethral Catheter 06/26/23 0837 Temperature probe 16 Fr. <1 day              Peripheral Intravenous Line  Duration                  Peripheral IV - Single Lumen 06/26/23 0750 20 G Anterior;Right Hand <1 day         Peripheral IV - Single Lumen 06/26/23 0754 20 G Anterior;Left Hand <1 day                  Significant Labs:    CBC/Anemia Profile:  Recent Labs   Lab 06/26/23  0740 06/26/23  0743 06/26/23  1117   WBC  --  17.19*  --    HGB  --  11.0*  --    HCT 37 36.4* 39   PLT  --  325  --    MCV  --  91  --    RDW  --  17.0*  --         Chemistries:  Recent Labs   Lab 06/26/23  0743 06/26/23  1121   * 128*   K 7.0* 6.0*    103   CO2 9* 7*   BUN 60* 58*   CREATININE 1.7* 1.6*   CALCIUM 7.6* 7.9*   ALBUMIN 3.0*  --    PROT 5.2*  --    BILITOT 1.5*  --    ALKPHOS 251*  --    ALT 2,411*  --    AST 2,083*  --    MG 2.3  --        All pertinent labs within the past 24 hours have been reviewed.    Significant Imaging: I have reviewed all pertinent imaging results/findings within the past 24 hours.

## 2023-06-26 NOTE — ASSESSMENT & PLAN NOTE
Discussed with the patient's son, Darrion, the likely etiology of her presentation today (LV thrombus) and risk of starting heparin drip with her tongue injury and risk for bleeding/aspiration. He believes his mother wishes for the most conservative management possible and agrees with holding heparin drip until ENT has evaluated her tongue injury. He is aware of her risk for stroke and/or worsening organ failure.    He wishes to explore palliative care/hospice options tomorrow morning.

## 2023-06-26 NOTE — ASSESSMENT & PLAN NOTE
Patient is confused; unclear how she bit her tongue (possibly during fall). Distal tongue with laceration; macerated    --ENT consulted

## 2023-06-26 NOTE — ASSESSMENT & PLAN NOTE
Bicarb 7 on admission; lactate 6.9. Likely secondary to showering emboli to multiple organs. See goals of care plan.    --Bicarb drip  --Treat sepsis

## 2023-06-26 NOTE — ASSESSMENT & PLAN NOTE
Cr 1.7 with K 7 and Bicarb 7 on admission. Family opting for conservative measures; no dialysis.    --Renal US  --Shift K as needed  --BMP Q6  --Avoid nephrotoxic agents  --Renally dose meds  --Strict I&O

## 2023-06-26 NOTE — ED NOTES
I-STAT Chem-8+ Results:   Value Reference Range   Sodium 132 136-145 mmol/L   Potassium  7.1 3.5-5.1 mmol/L   Chloride 102  mmol/L   Ionized Calcium 1.06 1.06-1.42 mmol/L   CO2 (measured) 14 23-29 mmol/L   Glucose 106  mg/dL   BUN 65 6-30 mg/dL   Creatinine 2.0 0.5-1.4 mg/dL   Hematocrit 37 36-54%

## 2023-06-26 NOTE — HPI
Dom Yates is a 77 year old woman with PMH HTN, dementia, CAD s/p CABG in 1/2023. She was found to be encephalopathic and yelling early this morning by son, noted to have tongue laceration, EMS called at that time. Further history limited by patient's altered mental status. ER workup notable for leukocytosis to 17, ISTAT lactic acid 5.3, initial pH 7.0 on VBG, bicarb 9. AST and ALT > 2000, Tbili 1.5, INR 2.6. BNP elevated to 3700. Noted to have potassium 7, EDWIN with creatinine 1.7 (baseline around 0.8). UA concerning for UTI. Hypothermic to 91. She was placed on bear hugger and received a dose of vanc/zosyn, 1L NS, 50 meq bicarb, was shifted for her hyperkalemia. Her BP on cuff read elevated, however with weak pulses and mottled appearance in ER, concern that patient hypotensive and in shock, she was started on levo. Arterial line unable to be placed despite multiple attempts by ER team. On exam patient is confused but following commands and speaking. No focal neuro deficits noted. Abdomen SNT. Tongue with large bite wyatt/laceration, mouth with dried blood. Pending CT head/CAP.     Spoke with patient's son Ford over the phone ([phone]). Patient with multiple falls in [place], was hospitalized several times there. Brought from [place] about a week and a half ago, plan [facility]. Per son, patient with rapid decline in her clinical condition over the past week and a half. Became encephalopathic last night. [place] yelling around 4 am, son found her on the floor, markedly encephalopathic, blood visible in mouth, appeared to be coming from her tongue at that time. Per son patient complaining of indigestion, but otherwise no subjective complaints over the past week. No history of GIB or seizures to his knowledge.

## 2023-06-26 NOTE — ASSESSMENT & PLAN NOTE
This patient does have evidence of infective focus  My overall impression is sepsis.  Source: Urinary Tract  Antibiotics given-   Antibiotics (72h ago, onward)    Start     Stop Route Frequency Ordered    06/26/23 1500  ceFEPIme (MAXIPIME) 1 g in dextrose 5 % in water (D5W) 5 % 100 mL IVPB (MB+)         -- IV Every 24 hours (non-standard times) 06/26/23 1414    06/26/23 1200  mupirocin 2 % ointment         07/01 0859 Nasl 2 times daily 06/26/23 1048    06/26/23 1131  vancomycin - pharmacy to dose  (vancomycin IVPB)        See Cecile for full Linked Orders Report.    -- IV pharmacy to manage frequency 06/26/23 1034        Latest lactate reviewed-  Recent Labs   Lab 06/26/23  1121   LACTATE 6.9*     Organ dysfunction indicated by Acute kidney injury, Acute liver injury and Encephalopathy    Fluid challenge Not needed - patient is not hypotensive      Post- resuscitation assessment Yes Perfusion exam was performed within 6 hours of septic shock presentation after bolus shows Adequate tissue perfusion assessed by non-invasive monitoring       Will Not start Pressors  Source control achieved by: Cefepime/Vanc  Patient presents with AMS, leukocytosis, Lactate 6, hypothermia; bradycardia    --UA concerning for infection; culture pending. No clear other sources  --Cefepime; Vanc  --Follow up cultures

## 2023-06-26 NOTE — Clinical Note
Diagnosis: Weakness [174205]   Admitting Provider:: NARCISO STEWART [[id number]]   Future Attending Provider: NARCISO STEWART [[id number]]   Reason for IP Medical Treatment  (Clinical interventions that can only be accomplished in the IP setting? ) :: unspecified shock   I certify that Inpatient services for greater than or equal to 2 midnights are medically necessary:: Yes   Plans for Post-Acute care--if anticipated (pick the single best option):: D. Skilled Nursing Placement

## 2023-06-26 NOTE — ASSESSMENT & PLAN NOTE
Echo    Result Date: 6/26/2023  · A layered left ventricular thrombus is present.  · The left ventricle is mildly enlarged with eccentric hypertrophy and   severely decreased systolic function. The estimated ejection fraction is   20%.  · The quantitatively derived ejection fraction is 21%.  · There is severe left ventricular global hypokinesis.  · Normal right ventricular size with normal right ventricular systolic   function.  · Left ventricular diastolic dysfunction.  · Biatrial enlargement.  · Mild to moderate tricuspid regurgitation.  · Mild aortic regurgitation.  · IVC not well visualized but CVP is at least 8 mmHg.

## 2023-06-26 NOTE — PROGRESS NOTES
Pharmacokinetic Initial Assessment: IV Vancomycin    Assessment/Plan:    Initiate intravenous vancomycin with loading dose of 1000 mg once in ED with subsequent doses when random concentrations are less than 20 mcg/mL  Desired empiric serum trough concentration is 10 to 20 mcg/mL  Draw vancomycin random level on 6/27/23 at 0400 w/ AM labs.  Pharmacy will continue to follow and monitor vancomycin.      Please contact pharmacy at extension [phone] with any questions regarding this assessment.     Thank you for the consult,   Aliza Wong, PharmD       Patient brief summary:  Dom Yates is a 77 y.o. female initiated on antimicrobial therapy with IV Vancomycin for treatment of suspected sepsis    Drug Allergies:   Review of patient's allergies indicates:  No Known Allergies    Actual Body Weight:   54.4 kg    Renal Function:   Estimated Creatinine Clearance: 22.1 mL/min (A) (based on SCr of 1.7 mg/dL (H)).,     Dialysis Method (if applicable):  N/A    CBC (last 72 hours):  Recent Labs   Lab Result Units 06/26/23  0743   WBC K/uL 17.19*   Hemoglobin g/dL 11.0*   Hematocrit % 36.4*   Platelets K/uL 325   Gran % % 83.3*   Lymph % % 5.6*   Mono % % 8.7   Eosinophil % % 0.4   Basophil % % 0.3   Differential Method  Automated       Metabolic Panel (last 72 hours):  Recent Labs   Lab Result Units 06/26/23  0743 06/26/23  0840   Sodium mmol/L 129*  --    Potassium mmol/L 7.0*  --    Chloride mmol/L 103  --    CO2 mmol/L 9*  --    Glucose mg/dL 99  --    Glucose, UA   --  Negative   BUN mg/dL 60*  --    Creatinine mg/dL 1.7*  --    Albumin g/dL 3.0*  --    Total Bilirubin mg/dL 1.5*  --    Alkaline Phosphatase U/L 251*  --    AST U/L 2,083*  --    ALT U/L 2,411*  --    Magnesium mg/dL 2.3  --        Drug levels (last 3 results):  No results for input(s): VANCOMYCINRA, VANCORANDOM, VANCOMYCINPE, VANCOPEAK, VANCOMYCINTR, VANCOTROUGH in the last 72 hours.    Microbiologic Results:  Microbiology Results (last 7 days)        Procedure Component Value Units Date/Time    MRSA Screen by PCR [[id number]] Collected: 06/26/23 1104    Order Status: Sent Specimen: Nasopharyngeal Swab from Nasal     Urine culture [[id number]] Collected: 06/26/23 0840    Order Status: No result Specimen: Urine Updated: 06/26/23 1017    Culture, Respiratory with Gram Stain [[id number]]     Order Status: No result Specimen: Respiratory     Blood culture x two cultures. Draw prior to antibiotics. [[id number]] Collected: 06/26/23 0857    Order Status: Sent Specimen: Blood from Peripheral, Hand, Left Updated: 06/26/23 0923    Blood culture x two cultures. Draw prior to antibiotics. [[id number]] Collected: 06/26/23 0855    Order Status: Sent Specimen: Blood from Peripheral, Hand, Right Updated: 06/26/23 0923

## 2023-06-26 NOTE — ASSESSMENT & PLAN NOTE
Noted on admission Echo. Concerned that this is likely etiology of multiorgan failure.    --Considering anticoagulation but she is likely to bleed from her tongue injury with risk for aspiration

## 2023-06-26 NOTE — ASSESSMENT & PLAN NOTE
Per son worsening over the past week and a half. Noted to have history of dementia, on namenda at home.   - CT head in process  - tox pending  - broad abx with infectious workup in process

## 2023-06-26 NOTE — CONSULTS
Single lumen 18G x 8CM midline placed right basilic vein. Max dwell date 25July2023, Lot# [id number].  Needle advanced into the vessel under real time ultrasound guidance.  Image recorded and saved.

## 2023-06-26 NOTE — ED PROVIDER NOTES
Encounter Date: 6/26/2023       History     Chief Complaint   Patient presents with    Altered Mental Status     From home. Son called out for altered mental status beginning at 540 this morning.      77-year-old female with a history of dementia, hypertension, left hip replacement, recent ED visit for frequent falls and worsening dementia with behavioral disturbance, now brought in by EMS after being found down by her son this morning on the ground.  Patient was apparently last seen normal yesterday evening.  Unclear exactly what time.  When EMS found patient she was altered, had a blood glucose 40, was bradycardic and very ill-appearing.  They administered her D10 EN route.  They also noted that she had an extensive laceration to her tongue and blood all over her mouth.    The history is provided by the EMS personnel. The history is limited by the absence of a caregiver and the condition of the patient.     Review of patient's allergies indicates:  No Known Allergies  Past Medical History:   Diagnosis Date    Dementia      History reviewed. No pertinent surgical history.  History reviewed. No pertinent family history.     Review of Systems   Unable to perform ROS: Mental status change       Physical Exam     Initial Vitals   BP Pulse Resp Temp SpO2   06/26/23 0706 06/26/23 0706 06/26/23 0706 06/26/23 0734 06/26/23 0706   (!) 212/126 (!) 47 20 (!) 91.1 °F (32.8 °C) 98 %      MAP       --                Physical Exam    Nursing note and vitals reviewed.  Constitutional: She has a sickly appearance. She appears ill. She appears distressed.   HENT:   Extensive macerated laceration to the tongue though hemostatic   Eyes: Pupils are equal, round, and reactive to light.   Neck: Neck supple.   Cardiovascular:            Bradycardic   Pulmonary/Chest: No respiratory distress.   Abdominal: Abdomen is soft. She exhibits distension. There is no abdominal tenderness. There is no rebound and no guarding.   Musculoskeletal:       Cervical back: Neck supple.      Comments: Left hip seems to be tender with any palpation or ROM     Neurological:   Patient agitated, confused, though she can answer simple questions at times and she is able to follow commands.  She is able to smile and she is able to raise both arms up and has symmetric motor function of her upper extremities.   Skin: There is cyanosis. There is pallor.   Skin is cold and mottled         ED Course   Procedures  Labs Reviewed   CBC W/ AUTO DIFFERENTIAL - Abnormal; Notable for the following components:       Result Value    WBC 17.19 (*)     Hemoglobin 11.0 (*)     Hematocrit 36.4 (*)     MCHC 30.2 (*)     RDW 17.0 (*)     Immature Granulocytes 1.7 (*)     Gran # (ANC) 14.3 (*)     Immature Grans (Abs) 0.30 (*)     Mono # 1.5 (*)     Gran % 83.3 (*)     Lymph % 5.6 (*)     All other components within normal limits   COMPREHENSIVE METABOLIC PANEL - Abnormal; Notable for the following components:    Sodium 129 (*)     Potassium 7.0 (*)     CO2 9 (*)     BUN 60 (*)     Creatinine 1.7 (*)     Calcium 7.6 (*)     Total Protein 5.2 (*)     Albumin 3.0 (*)     Total Bilirubin 1.5 (*)     Alkaline Phosphatase 251 (*)     AST 2,083 (*)     ALT 2,411 (*)     eGFR 31.1 (*)     Anion Gap 17 (*)     All other components within normal limits    Narrative:     ADD ON CPK PER BOONE LOYA MD ORDER# [id number] @  06/26/2023    08:01    URINALYSIS, REFLEX TO URINE CULTURE - Abnormal; Notable for the following components:    Appearance, UA Hazy (*)     Protein, UA 2+ (*)     Leukocytes, UA 1+ (*)     All other components within normal limits    Narrative:     Specimen Source->Urine   PROTIME-INR - Abnormal; Notable for the following components:    Prothrombin Time 26.7 (*)     INR 2.6 (*)     All other components within normal limits    Narrative:     ADD ON CPK PER BOONE LOYA MD ORDER# [id number] @  06/26/2023    08:01    B-TYPE NATRIURETIC PEPTIDE - Abnormal; Notable for the following  components:    BNP 3,784 (*)     All other components within normal limits    Narrative:     ADD ON CPK PER BOONE LOYA MD ORDER# [id number] @  06/26/2023    08:01    TROPONIN I - Abnormal; Notable for the following components:    Troponin I 0.061 (*)     All other components within normal limits    Narrative:     ADD ON CPK PER BOONE LOYA MD ORDER# [id number] @  06/26/2023    08:01    PROCALCITONIN - Abnormal; Notable for the following components:    Procalcitonin 0.66 (*)     All other components within normal limits    Narrative:     ADD ON CPK PER BOONE LOYA MD ORDER# [id number] @  06/26/2023    08:01    SALICYLATE LEVEL - Abnormal; Notable for the following components:    Salicylate Lvl <5.0 (*)     All other components within normal limits    Narrative:     ADD ON CPK PER BOONE LOYA MD ORDER# [id number] @  06/26/2023    08:01    CK - Abnormal; Notable for the following components:     (*)     All other components within normal limits    Narrative:     ADD ON CPK PER BOONE LOYA MD ORDER# [id number] @  06/26/2023    08:01    URINALYSIS MICROSCOPIC - Abnormal; Notable for the following components:    WBC, UA 20 (*)     Bacteria Moderate (*)     Hyaline Casts, UA 4 (*)     All other components within normal limits    Narrative:     Specimen Source->Urine   ISTAT PROCEDURE - Abnormal; Notable for the following components:    POC BUN 65 (*)     POC Creatinine 2.0 (*)     POC Sodium 132 (*)     POC Potassium 7.1 (*)     POC TCO2 (MEASURED) 14 (*)     All other components within normal limits   ISTAT PROCEDURE - Abnormal; Notable for the following components:    POC PH 7.096 (*)     POC PCO2 28.8 (*)     POC PO2 24 (*)     POC HCO3 8.9 (*)     POC SATURATED O2 27 (*)     POC TCO2 10 (*)     All other components within normal limits   ISTAT LACTATE - Abnormal; Notable for the following components:    POC Lactate 5.46 (*)     All other components within normal limits   POCT GLUCOSE -  Abnormal; Notable for the following components:    POCT Glucose 119 (*)     All other components within normal limits   ISTAT PROCEDURE - Abnormal; Notable for the following components:    POC PH 7.114 (*)     POC PCO2 47.0 (*)     POC PO2 19 (*)     POC HCO3 15.1 (*)     POC SATURATED O2 18 (*)     POC TCO2 16 (*)     All other components within normal limits   ISTAT LACTATE - Abnormal; Notable for the following components:    POC Lactate 7.46 (*)     All other components within normal limits   ISTAT PROCEDURE - Abnormal; Notable for the following components:    POC PH 7.101 (*)     POC PCO2 45.5 (*)     POC PO2 33 (*)     POC HCO3 14.1 (*)     POC SATURATED O2 45 (*)     POC Sodium 132 (*)     POC Potassium 7.8 (*)     POC TCO2 16 (*)     All other components within normal limits   MRSA SCREEN BY PCR   MAGNESIUM    Narrative:     ADD ON CPK PER BOONE LOYA MD ORDER# [id number] @  06/26/2023    08:01    LIPASE    Narrative:     ADD ON CPK PER BOONE LOYA MD ORDER# [id number] @  06/26/2023    08:01    ALCOHOL,MEDICAL (ETHANOL)    Narrative:     ADD ON CPK PER BOONE LOYA MD ORDER# [id number] @  06/26/2023    08:01    TSH    Narrative:     ADD ON CPK PER BOONE LOYA MD ORDER# [id number] @  06/26/2023    08:01    CK   TOXICOLOGY SCREEN, URINE, RANDOM (COMPLIANCE)    Narrative:     Specimen Source->Urine   TYPE & SCREEN   POCT GLUCOSE        ECG Results              EKG 12-lead (Final result)  Result time 06/26/23 14:26:22      Final result by Interface, Lab In [facility] (06/26/23 14:26:22)                   Narrative:    Test Reason : I95.9,    Vent. Rate : 059 BPM     Atrial Rate : 059 BPM     P-R Int : 258 ms          QRS Dur : 114 ms      QT Int : 522 ms       P-R-T Axes : 071 -28 076 degrees     QTc Int : 516 ms    Sinus bradycardia with 1st degree A-V block  Low voltage QRS  Intraventricular conduction delay  Abnormal R wave progression  Cannot rule out Anterior infarct (cited on or before  26-JUN-2023)  Prolonged QT  Abnormal ECG  When compared with ECG of 26-JUN-2023 07:33,  QRS duration has decreased  Questionable change in initial forces of Lateral leads  Confirmed by CARLINE MURPHY MD ([id number]) on 6/26/2023 2:26:12 PM    Referred By: [email or web address]   SELF           Confirmed By:CARLINE MURPHY MD                                     EKG 12-lead (Final result)  Result time 06/26/23 08:11:33      Final result by Interface, Lab In [facility] (06/26/23 08:11:33)                   Narrative:    Test Reason : R53.1,    Vent. Rate : 045 BPM     Atrial Rate : 045 BPM     P-R Int : 272 ms          QRS Dur : 148 ms      QT Int : 656 ms       P-R-T Axes : 055 -79 064 degrees     QTc Int : 567 ms    Possibly Sinus bradycardia with 1st degree A-V block but marked artifact  making Ps hard to see  Left axis deviation  Nonspecific intraventricular block  Possible Hu- Lateral infarct ,age undetermined vs due to conduction  Inferior infarct (cited on or before 20-MAY-2010)vs due to conduction  Abnormal ECG  When compared with ECG of 27-SEP-2011 13:55,  Significant changes have occurred  Confirmed by Manjinder GONZALEZ MD ([id number]) on 6/26/2023 8:11:27 AM    Referred By: System System           Confirmed By:Manjinder GONZALEZ MD                                  Imaging Results              CTA Chest Abdomen Pelvis (Final result)  Result time 06/26/23 10:52:25      Final result by Ludwin Daigle III, MD (06/26/23 10:52:25)                   Impression:      No evidence of aortic inflammation or infection.  Aorta and branches demonstrate no inflammatory changes.    Coronary calcifications.    No evidence of pulmonary embolus.    Posterior pleural thickening bilaterally.    Small amount of ascites around the liver, spleen, and within the pelvis.    Left renal cyst.    Diverticulosis no evidence of diverticulitis      Electronically signed by: Ludwin Daigle MD  Date:    06/26/2023  Time:    10:52               Narrative:    EXAMINATION:  CTA  CHEST ABDOMEN PELVIS    CLINICAL HISTORY:  Aortic infection or inflammation;    FINDINGS:  Comparison none.    Patient was administered 75 cc of Omnipaque 350 intravenously.    There are coronary calcifications.  There are coronary calcifications.  No aneurysm or dissection is seen.  Aorta, celiac, SMA, renal arteries, SEGUNDO and iliac vessels demonstrate plaque.  No aortic thickening or inflammation is seen.  No aortic branch wall thickening seen.    No mediastinal, hilar, or axillary adenopathy is seen.  There is pleural thickening posteriorly bilaterally.  There is a left lung base granuloma.  Pulmonary vessels are well opacified.  There is no evidence of pulmonary embolus or acute aortic syndrome.  There is a small amount of ascites around the liver and spleen.  Liver, gallbladder, spleen show nothing unusual.  There is a small hiatal hernia.  Pancreas is unremarkable.  Adrenal glands are not enlarged.  There is a left renal cyst.  No para-aortic or retroperitoneal or mesenteric adenopathy is seen.  There is a small amount of free fluid in the pelvis.  There is a left hip prosthesis.  There is diverticulosis with no evidence of acute diverticulitis.  Bones reveal DJD.                                       CT Cervical Spine Without Contrast (Final result)  Result time 06/26/23 11:02:42      Final result by Jas Sher DO (06/26/23 11:02:42)                   Impression:      CT head: No acute intracranial findings allowing for distortion by motion artifact specifically without evidence for acute intracranial hemorrhage or sulcal effacement to suggest large territory recent infarction.  Mild moderate left frontal encephalomalacia which may be sequela of prior infarction with additional encephalomalacia right cerebellum and left caudate.    No evidence for acute intracranial hemorrhage or definite new abnormal parenchymal attenuation.    CT cervical spine: Spondylo degenerative change cervical spine similar to  prior without evidence for acute fracture    Allowing for CT technique degenerative change most pronounced at C4/C5 with posterior disc osteophyte, uncovertebral joint hypertrophy and facet arthropathy with mild moderate central canal and moderate bilateral neural foraminal stenosis.    Clinical correlation and further evaluation as warranted.      Electronically signed by: Jas Sher DO  Date:    06/26/2023  Time:    11:02               Narrative:    EXAMINATION:  CT HEAD WITHOUT CONTRAST; CT CERVICAL SPINE WITHOUT CONTRAST    CLINICAL HISTORY:  Mental status change, unknown cause;; Neck trauma (Age >= 65y);    TECHNIQUE:  CT head: Multiple sequential 5 mm axial images of the head without contrast.  Coronal and sagittal reformatted imaging from the axial acquisition.    CT cervical spine: Multiple sequential 1.25 mm axial images of the cervical spine without contrast.  Coronal and sagittal reformatted imaging from the axial acquisition.    COMPARISON:  CT head and cervical spine 06/13/2023    FINDINGS:  CT head: Study distorted by motion artifact.  Mild moderate right cerebellar encephalomalacia stable compatible with remote infarction.  Small hypodensity left caudate osteo unchanged compatible with prior infarction    Mild ill-defined decreased attenuation supratentorial white matter with slight volume loss left inferior frontal lobe which may be sequela of prior infarction unchanged.    Stable fat density focus along the suprasellar cistern suggestive for lipoma.  Prominent basal gangliar calcifications again seen    Allowing for artifact from motion there is no evidence for acute intracranial hemorrhage or sulcal effacement large territory recent infarction    Mild mucosal thickening inferior maxillary antra with partial opacification aerated secretions left sphenoid sinus    CT cervical spine: Cervical sagittal alignment is similar to prior with trace grade 1 anterolisthesis of C3 on C4.  There is  straightening of the expected normal cervical lordosis.  Continued degenerative disc disease with disc height loss and endplate degeneration all levels.  Allowing for this the cervical vertebral body heights and contours relatively stable without evidence for acute fracture.  Craniocervical junction within normal limits.  Continued mineralization and thickening of the posterior dental ligament.  No consolidation visualized lung apices.    Allowing for CT technique degenerative change relatively stable and most pronounced at C4/C5 with posterior disc osteophyte, uncovertebral joint hypertrophy and facet arthropathy with mild moderate central canal stenosis with moderate bilateral bony neural foraminal stenosis    Please note there is continued prominent bulging disc at C3/C4 with facet joint arthropathy with probable mild central canal stenosis and moderate to severe left bony neural foraminal stenosis.                                       CT Head Without Contrast (Final result)  Result time 06/26/23 11:02:42      Final result by Jas Sher DO (06/26/23 11:02:42)                   Impression:      CT head: No acute intracranial findings allowing for distortion by motion artifact specifically without evidence for acute intracranial hemorrhage or sulcal effacement to suggest large territory recent infarction.  Mild moderate left frontal encephalomalacia which may be sequela of prior infarction with additional encephalomalacia right cerebellum and left caudate.    No evidence for acute intracranial hemorrhage or definite new abnormal parenchymal attenuation.    CT cervical spine: Spondylo degenerative change cervical spine similar to prior without evidence for acute fracture    Allowing for CT technique degenerative change most pronounced at C4/C5 with posterior disc osteophyte, uncovertebral joint hypertrophy and facet arthropathy with mild moderate central canal and moderate bilateral neural foraminal  stenosis.    Clinical correlation and further evaluation as warranted.      Electronically signed by: Jas Sher DO  Date:    06/26/2023  Time:    11:02               Narrative:    EXAMINATION:  CT HEAD WITHOUT CONTRAST; CT CERVICAL SPINE WITHOUT CONTRAST    CLINICAL HISTORY:  Mental status change, unknown cause;; Neck trauma (Age >= 65y);    TECHNIQUE:  CT head: Multiple sequential 5 mm axial images of the head without contrast.  Coronal and sagittal reformatted imaging from the axial acquisition.    CT cervical spine: Multiple sequential 1.25 mm axial images of the cervical spine without contrast.  Coronal and sagittal reformatted imaging from the axial acquisition.    COMPARISON:  CT head and cervical spine 06/13/2023    FINDINGS:  CT head: Study distorted by motion artifact.  Mild moderate right cerebellar encephalomalacia stable compatible with remote infarction.  Small hypodensity left caudate osteo unchanged compatible with prior infarction    Mild ill-defined decreased attenuation supratentorial white matter with slight volume loss left inferior frontal lobe which may be sequela of prior infarction unchanged.    Stable fat density focus along the suprasellar cistern suggestive for lipoma.  Prominent basal gangliar calcifications again seen    Allowing for artifact from motion there is no evidence for acute intracranial hemorrhage or sulcal effacement large territory recent infarction    Mild mucosal thickening inferior maxillary antra with partial opacification aerated secretions left sphenoid sinus    CT cervical spine: Cervical sagittal alignment is similar to prior with trace grade 1 anterolisthesis of C3 on C4.  There is straightening of the expected normal cervical lordosis.  Continued degenerative disc disease with disc height loss and endplate degeneration all levels.  Allowing for this the cervical vertebral body heights and contours relatively stable without evidence for acute fracture.   Craniocervical junction within normal limits.  Continued mineralization and thickening of the posterior dental ligament.  No consolidation visualized lung apices.    Allowing for CT technique degenerative change relatively stable and most pronounced at C4/C5 with posterior disc osteophyte, uncovertebral joint hypertrophy and facet arthropathy with mild moderate central canal stenosis with moderate bilateral bony neural foraminal stenosis    Please note there is continued prominent bulging disc at C3/C4 with facet joint arthropathy with probable mild central canal stenosis and moderate to severe left bony neural foraminal stenosis.                                        X-Ray Pelvis Routine AP (Final result)  Result time 06/26/23 09:04:31      Final result by Brant Tobin MD (06/26/23 09:04:31)                   Impression:      Posterior pelvic ring largely obscured by presumed intestinal ileus.  Correlate clinically.    Questionable left hamstring avulsion fracture common new or more conspicuous since 06/13/2023.  Correlate clinically.  Consider additional imaging.    This report was flagged in Epic as abnormal.      Electronically signed by: Brant Tobin  Date:    06/26/2023  Time:    09:04               Narrative:    EXAMINATION:  XR PELVIS ROUTINE AP    CLINICAL HISTORY:  Unspecified fall, initial encounter    TECHNIQUE:  AP view of the pelvis was performed.    COMPARISON:  Radiographs of the bilateral hips, including AP pelvis, 06/13/2023.    FINDINGS:  Numerous gas-filled loops of small large bowel project over the lumbosacral spine and iliac wings, significantly obscuring osseous detail.    Allowing for this, the better visualized portions of the osseous pelvic ring demonstrate no pubic symphysis diastasis.    A faint semi-circular calcification projecting inferior to the left inferior pubic ramus is new or more conspicuous, relative to 06/13/2023, and could possibly represent a hamstring avulsion  fracture.  Otherwise, the anterior pelvic ring demonstrates no acute fracture deformity.    There is advanced arthritis in the right hip with superior or superomedial joint space narrowing.    There is a left hip hemiarthroplasty, with a cerclage wire about the femoral component stem.  This hardware is seen only in the AP projection.  There is mature periosteal callus about the proximal left femur.                                       X-Ray Chest AP Portable (Final result)  Result time 06/26/23 08:55:40      Final result by James De La Garza MD (06/26/23 08:55:40)                   Impression:      See above      Electronically signed by: James De La Garza MD  Date:    06/26/2023  Time:    08:55               Narrative:    EXAMINATION:  XR CHEST AP PORTABLE    CLINICAL HISTORY:  Sepsis;    TECHNIQUE:  Single frontal view of the chest was performed.    COMPARISON:  06/13/2023    FINDINGS:  Postoperative changes as before.  Mild cardiomegaly.  The lungs are clear.  No pleural effusion                                       Medications   calcium gluconate 100 mg/mL (10%) injection (  Not Given 6/26/23 0830)   fentaNYL 50 mcg/mL injection 50 mcg (50 mcg Intravenous Given 6/26/23 0749)   piperacillin-tazobactam (ZOSYN) 4.5 g in dextrose 5 % in water (D5W) 5 % 100 mL IVPB (MB+) (0 g Intravenous Stopped 6/26/23 0954)   calcium gluconate 1 g in NS IVPB (premixed) (0 g Intravenous Stopped 6/26/23 0802)   dextrose 10% bolus 250 mL 250 mL (0 mLs Intravenous Stopped 6/26/23 0829)   insulin regular injection 5 Units 0.05 mL (5 Units Intravenous Given 6/26/23 0811)   sodium bicarbonate solution 50 mEq (50 mEq Intravenous Given 6/26/23 0754)   sodium chloride 0.9% bolus 1,000 mL 1,000 mL (0 mLs Intravenous Stopped 6/26/23 0924)   calcium gluconate 1g in NS 50mL (ready to mix system) (0 g Intravenous Stopped 6/26/23 1004)   iohexoL (OMNIPAQUE 350) injection 75 mL (75 mLs Intravenous Given 6/26/23 1041)   vancomycin (VANCOCIN) 1,000 mg in  dextrose 5 % (D5W) 250 mL IVPB (Vial-Mate) (0 mg Intravenous Stopped 6/26/23 1456)   calcium gluconate 1 g in NS IVPB (premixed) (0 g Intravenous Stopped 6/26/23 1335)   dextrose 10% bolus 250 mL 250 mL (0 mLs Intravenous Stopped 6/26/23 1306)   insulin regular injection 5 Units 0.05 mL (5 Units Intravenous Given 6/26/23 1317)   perflutren protein-A microsphr 0.22 mg/mL IV susp (0.66 mg Intravenous Given 6/26/23 1342)   acetylcysteine 20% (200 mg/ml) (ACETADOTE) 8,200 mg in dextrose 5 % (D5W) 250 mL infusion (0 mg Intravenous Stopped 6/26/23 1701)   morphine injection 1 mg (1 mg Intravenous Given 6/26/23 1838)   morphine injection 1 mg (1 mg Intravenous Given 6/26/23 1849)     Medical Decision Making:   History:   I obtained history from: EMS provider.  Old Medical Records: I decided to obtain old medical records.  Old Records Summarized: records from clinic visits and records from previous admission(s).  Initial Assessment:   Patient appears acutely ill, as though she is in shock with mottled skin, diminished peripheral pulses  Immediately moved to a resuscitation room after my initial evaluation  Placed on the monitor, rectal temp obtain showing a core temp of 91°    Second IV established, IV fluids began, Johnny Hugger placed on patient  Initial labs at bedside including VBG and i-STAT show acute renal failure, potassium of 7.1, VBG of 7.0, lactate of 5    Differential diagnosis for shock would include hypovolemia 2/2 dehydration or blood loss, distributive process like sepsis or anaphylaxis, endocrine crisis like adrenal shock or hypothyroidism, cardiopulmonary process like cardiogenic shock, PE or tamponade, or polypharmacy.    At this point in time, I think the most likely etiology of this patient's shock is vascular catastrophe like dissection, sepsis urinary or intra-abdominal.    The following tests and interventions will be performed to determine the source of this patient's hypotension:  -Labs: CBC, CMP,  "lactate, UA, cultures, VBG, i-STAT, TSH, CPK as patient found down on the ground this morning  -Imaging:  CT head, C-spine, chest abdomen pelvis given possibility of intra-abdominal process like perforation, bowel ischemia or vascular catastrophe  -Close monitoring with frequent VS checks  -Intravenous fluid boluses:  Normal saline  -broad-spectrum antibiotics in case this is sepsis  -Medication review    Independently Interpreted Test(s):   I have ordered and independently interpreted X-rays - see prior notes.  I have ordered and independently interpreted EKG Reading(s) - see prior notes  Clinical Tests:   Lab Tests: Ordered and Reviewed  Radiological Study: Reviewed and Ordered  Medical Tests: Ordered and Reviewed  Sepsis Perfusion Assessment: "I attest a sepsis perfusion exam was performed within 6 hours of sepsis, severe sepsis, or septic shock presentation, following fluid resuscitation."  ED Management:  10:38 AM  Labs have been reviewed and patient has evidence of multiorgan failure and likely shock liver.  Her hyperkalemia has been shifted and her bradycardia improved immediately with this intervention.  Her blood pressures have appeared high on the noninvasive readings though we can not palpate peripheral pulses were not sure how to interpret this.  We have started her empirically on peripheral vasopressors as a manual blood pressure was around 80 systolic which seems more consistent with how she appears clinically and our inability to palpate peripheral pulses.  We have attempted to get an arterial line as well to get a more accurate understanding of her true pressure but so far have been unsuccessful attempts.  Patient has been treated with broad-spectrum antibiotics.  We have maintained her on a non-rebreather as we had difficulty getting accurate oxygen saturation levels, likely secondary to her underlying poor perfusion.  At this point we are actually for the 1st time getting good readings in the high " 90s-100 range.  CT head, C-spine, chest abdomen pelvis now being obtain so we can get more information to determine how reversible this processes and to help the son in making decisions for how invasive aggressive we are going to be in trying to reverse this process.  Other:   I have discussed this case with another health care provider.       <> Summary of the Discussion: Critical care fellow    [facility] discussion with son, at bedside  -I have updated the patient's son on patient's very serious condition and my worry that the patient is shock and her high likelihood of developing complete cardiopulmonary failure in the next 24 hours.  -we discussed with the patient's preferences would be in such a situation.  Patient has dementia and worsening debility and behavioral disturbances and the plan was for her to be enrolled in a nursing home in [facility] today  -son is worried that this quality of life that she currently has would not be acceptable in his leaning towards not initiating more invasive measures should the patient's condition decline further.            Attending Attestation:         Attending Critical Care:   Critical Care Times:   ==============================================================  Total Critical Care Time - exclusive of procedural time: 45 minutes minutes.  ==============================================================  Critical care was necessary to treat or prevent imminent or life-threatening deterioration of the following conditions: hypotension, dehydration, nontraumatic shock and metabolic crisis.   The following critical care procedures were done by me (see procedure notes): pulse oximetry.   Critical care was time spent personally by me on the following activities: obtaining history from patient or relative, examination of patient, review of old charts, review of x-rays / CT sent with the patient, development of treatment plan with patient or relative, ordering lab, x-rays, and/or EKG,  ordering and performing treatments and interventions, evaluation of patient's response to treatment, discussion with consultants, re-evaluation of patient's conition and end of life discussions.   Critical Care Condition: life-threatening                        Clinical Impression:   Final diagnoses:  [R53.1] Weakness  [W19.XXXA] Fall  [R57.9] Shock (Primary)        ED Disposition Condition    Admit Critical                Nadia Lentz MD  06/26/23 1344       Nadia Lentz MD  08/03/23 9122

## 2023-06-26 NOTE — H&P
[facility] - Cardiac Medical ICU  Critical Care Medicine  History & Physical    Patient Name: Dom Yates  MRN: [id number]  Admission Date: 6/26/2023  Hospital Length of Stay: 0 days  Code Status: DNR  Attending Physician: Ishan Downing MD   Primary Care Provider: Primary Doctor No   Principal Problem: EDWIN (acute kidney injury)    Subjective:     HPI:  Dom Yates is a 77 year old woman with PMH HTN, dementia, CAD s/p CABG in 1/2023. She was found to be encephalopathic and yelling early this morning by son, noted to have tongue laceration, EMS called at that time. Further history limited by patient's altered mental status. ER workup notable for leukocytosis to 17, ISTAT lactic acid 5.3, initial pH 7.0 on VBG, bicarb 9. AST and ALT > 2000, Tbili 1.5, INR 2.6. BNP elevated to 3700. Noted to have potassium 7, EDWIN with creatinine 1.7 (baseline around 0.8). UA concerning for UTI. Hypothermic to 91. She was placed on bear hugger and received a dose of vanc/zosyn, 1L NS, 50 meq bicarb, was shifted for her hyperkalemia. Her BP on cuff read elevated, however with weak pulses and mottled appearance in ER, concern that patient hypotensive and in shock, she was started on levo. Arterial line unable to be placed despite multiple attempts by ER team. On exam patient is confused but following commands and speaking. No focal neuro deficits noted. Abdomen SNT. Tongue with large bite wyatt/laceration, mouth with dried blood. Pending CT head/CAP.     Spoke with patient's son Ford over the phone ([phone]). Patient with multiple falls in [place], was hospitalized several times there. Brought from [place] about a week and a half ago, plan was to ultimately to admit her to [facility] care today. Per son, patient with rapid decline in her clinical condition over the past week and a half. Became encephalopathic last night. [place] yelling around 4 am, son found her on the floor, markedly encephalopathic, blood  visible in mouth, appeared to be coming from her tongue at that time. Per son patient complaining of indigestion, but otherwise no subjective complaints over the past week. No history of GIB or seizures to his knowledge.       Hospital/ICU Course:  No notes on file     Past Medical History:   Diagnosis Date    Dementia        History reviewed. No pertinent surgical history.    Review of patient's allergies indicates:  No Known Allergies    Family History    None       Tobacco Use    Smoking status: Not on file    Smokeless tobacco: Not on file   Substance and Sexual Activity    Alcohol use: Not on file    Drug use: Not on file    Sexual activity: Not on file      Review of Systems   Unable to perform ROS: Dementia   Objective:     Vital Signs (Most Recent):  Temp: (!) 93.9 °F (34.4 °C) (06/26/23 1210)  Pulse: (!) 57 (06/26/23 1210)  Resp: (!) 21 (06/26/23 1210)  BP: (!) 158/79 (06/26/23 1210)  SpO2: 100 % (06/26/23 1210) Vital Signs (24h Range):  Temp:  [91.1 °F (32.8 °C)-93.9 °F (34.4 °C)] 93.9 °F (34.4 °C)  Pulse:  [47-79] 57  Resp:  [20-28] 21  SpO2:  [78 %-100 %] 100 %  BP: (140-212)/() 158/79   Weight: 54.4 kg (120 lb)  Body mass index is 22.31 kg/m².      Intake/Output Summary (Last 24 hours) at 6/26/2023 1253  Last data filed at 6/26/2023 0954  Gross per 24 hour   Intake 1100 ml   Output --   Net 1100 ml          Physical Exam  Constitutional:       Appearance: She is ill-appearing.      Comments: Hypothermic; mottled   HENT:      Head: Normocephalic and atraumatic.      Mouth/Throat:      Comments: Laceration and maceration to distal tongue  Cardiovascular:      Rate and Rhythm: Bradycardia present.      Heart sounds: S1 normal and S2 normal. No murmur heard.  Pulmonary:      Effort: Pulmonary effort is normal. No tachypnea or respiratory distress.      Breath sounds: Normal breath sounds. No decreased breath sounds, wheezing, rhonchi or rales.   Abdominal:      General: Abdomen is protuberant.       Palpations: Abdomen is soft.      Tenderness: There is no abdominal tenderness. There is no guarding or rebound.   Musculoskeletal:      Cervical back: Neck supple.   Skin:     Coloration: Skin is mottled.   Neurological:      GCS: GCS eye subscore is 4. GCS verbal subscore is 4. GCS motor subscore is 6.          Vents:     Lines/Drains/Airways       Drain  Duration                  Urethral Catheter 06/26/23 0837 Temperature probe 16 Fr. <1 day              Peripheral Intravenous Line  Duration                  Peripheral IV - Single Lumen 06/26/23 0750 20 G Anterior;Right Hand <1 day         Peripheral IV - Single Lumen 06/26/23 0754 20 G Anterior;Left Hand <1 day                  Significant Labs:    CBC/Anemia Profile:  Recent Labs   Lab 06/26/23  0740 06/26/23  0743 06/26/23  1117   WBC  --  17.19*  --    HGB  --  11.0*  --    HCT 37 36.4* 39   PLT  --  325  --    MCV  --  91  --    RDW  --  17.0*  --         Chemistries:  Recent Labs   Lab 06/26/23  0743 06/26/23  1121   * 128*   K 7.0* 6.0*    103   CO2 9* 7*   BUN 60* 58*   CREATININE 1.7* 1.6*   CALCIUM 7.6* 7.9*   ALBUMIN 3.0*  --    PROT 5.2*  --    BILITOT 1.5*  --    ALKPHOS 251*  --    ALT 2,411*  --    AST 2,083*  --    MG 2.3  --        All pertinent labs within the past 24 hours have been reviewed.    Significant Imaging: I have reviewed all pertinent imaging results/findings within the past 24 hours.    Assessment/Plan:     Neuro  Acute encephalopathy  Head CT negative; likely multifactorial in the setting of sepsis; dementia.      Dementia  On Wellbutrin and memantine    Cardiac/Vascular  Acute combined systolic and diastolic heart failure  Echo    Result Date: 6/26/2023  · A layered left ventricular thrombus is present.  · The left ventricle is mildly enlarged with eccentric hypertrophy and   severely decreased systolic function. The estimated ejection fraction is   20%.  · The quantitatively derived ejection fraction is 21%.  ·  There is severe left ventricular global hypokinesis.  · Normal right ventricular size with normal right ventricular systolic   function.  · Left ventricular diastolic dysfunction.  · Biatrial enlargement.  · Mild to moderate tricuspid regurgitation.  · Mild aortic regurgitation.  · IVC not well visualized but CVP is at least 8 mmHg.           Left ventricular thrombus  Noted on admission Echo. Concerned that this is likely etiology of multiorgan failure.    --Considering anticoagulation but she is likely to bleed from her tongue injury with risk for aspiration    Renal/  * EDWIN (acute kidney injury)  Cr 1.7 with K 7 and Bicarb 7 on admission. Family opting for conservative measures; no dialysis.    --Renal US  --Shift K as needed  --BMP Q6  --Avoid nephrotoxic agents  --Renally dose meds  --Strict I&O    Metabolic acidosis  Bicarb 7 on admission; lactate 6.9. Likely secondary to showering emboli to multiple organs. See goals of care plan.    --Bicarb drip  --Treat sepsis    Hyperkalemia  See EDWIN    ID  Severe sepsis  This patient does have evidence of infective focus  My overall impression is sepsis.  Source: Urinary Tract  Antibiotics given-   Antibiotics (72h ago, onward)    Start     Stop Route Frequency Ordered    06/26/23 1500  ceFEPIme (MAXIPIME) 1 g in dextrose 5 % in water (D5W) 5 % 100 mL IVPB (MB+)         -- IV Every 24 hours (non-standard times) 06/26/23 1414    06/26/23 1200  mupirocin 2 % ointment         07/01 0859 Nasl 2 times daily 06/26/23 1048    06/26/23 1131  vancomycin - pharmacy to dose  (vancomycin IVPB)        See Hyperspace for full Linked Orders Report.    -- IV pharmacy to manage frequency 06/26/23 1034        Latest lactate reviewed-  Recent Labs   Lab 06/26/23  1121   LACTATE 6.9*     Organ dysfunction indicated by Acute kidney injury, Acute liver injury and Encephalopathy    Fluid challenge Not needed - patient is not hypotensive      Post- resuscitation assessment Yes Perfusion exam  was performed within 6 hours of septic shock presentation after bolus shows Adequate tissue perfusion assessed by non-invasive monitoring       Will Not start Pressors  Source control achieved by: Cefepime/Vanc  Patient presents with AMS, leukocytosis, Lactate 6, hypothermia; bradycardia    --UA concerning for infection; culture pending. No clear other sources  --Cefepime; Vanc  --Follow up cultures    GI  Transaminitis  LFTs >2000 with T. Bili 1.5. Initially thought to be due to hypotension but BP stable on admission. Likely microemboli from LV thrombus. Tylenol level negative.    --Liver US with doppler  --NAC  --Trend LFTs  --Avoid hepatotoxic agents      Orthopedic  Tongue laceration  Patient is confused; unclear how she bit her tongue (possibly during fall). Distal tongue with laceration; macerated    --ENT consulted    Palliative Care  Goals of care, counseling/discussion  Discussed with the patient's son, Darrion, the likely etiology of her presentation today (LV thrombus) and risk of starting heparin drip with her tongue injury and risk for bleeding/aspiration. He believes his mother wishes for the most conservative management possible and agrees with holding heparin drip until ENT has evaluated her tongue injury. He is aware of her risk for stroke and/or worsening organ failure.    He wishes to explore palliative care/hospice options tomorrow morning.         Critical Care Time: 60 minutes  Critical secondary to Patient has a condition that poses threat to life and bodily function: Acute Renal Failure     Critical care was time spent personally by me on the following activities: development of treatment plan with patient or surrogate and bedside caregivers, discussions with consultants, evaluation of patient's response to treatment, examination of patient, ordering and performing treatments and interventions, ordering and review of laboratory studies, ordering and review of radiographic studies, pulse  oximetry, re-evaluation of patient's condition. This critical care time did not overlap with that of any other provider or involve time for any procedures.     Annika Chao NP  Critical Care Medicine  [facility] - Cardiac Medical ICU

## 2023-06-26 NOTE — CONSULTS
Consult received, patient to be admitted to Critical Care Medicine. Full H&P to follow.    Rosio Draper MD  Pulmonary/Critical Care Fellow  06/26/2023 10:04 AM  Spectra: [phone]

## 2023-06-26 NOTE — SUBJECTIVE & OBJECTIVE
Medications:  Continuous Infusions:   dexmedeTOMIDine (Precedex) infusion (titrating) 0.2 mcg/kg/hr (06/26/23 1808)    sodium bicarbonate drip 100 mL/hr at 06/26/23 1324     Scheduled Meds:   acetylcysteine  50 mg/kg Intravenous Once    acetylcysteine  100 mg/kg Intravenous Once    calcium gluconate        ceFEPime (MAXIPIME) IVPB  1 g Intravenous Q24H    famotidine (PF)  20 mg Intravenous Daily    mupirocin   Nasal BID     PRN Meds:sodium chloride, dextrose 10%, sodium chloride 0.9%, Pharmacy to dose Vancomycin consult **AND** vancomycin - pharmacy to dose     No current facility-administered medications on file prior to encounter.     Current Outpatient Medications on File Prior to Encounter   Medication Sig    albuterol (PROVENTIL/VENTOLIN HFA) 90 mcg/actuation inhaler albuterol sulfate HFA 90 mcg/actuation aerosol inhaler   INHALE 2 PUFFS BY MOUTH 4 TIMES DAILY    amiodarone (PACERONE) 200 MG Tab amiodarone 200 mg tablet    amLODIPine (NORVASC) 10 MG tablet Take 10 mg by mouth.    buPROPion (WELLBUTRIN XL) 150 MG TB24 tablet bupropion HCl  mg 24 hr tablet, extended release   TAKE 1 TABLET BY MOUTH EVERY MORNING FOR 30 DAYS    celecoxib (CELEBREX) 200 MG capsule Take 200 mg by mouth.    chlordiazepoxide (LIBRIUM) 5 MG capsule chlordiazepoxide 5 mg capsule    dextroamphetamine-amphetamine 30 mg Tab dextroamphetamine-amphetamine 30 mg tablet   TAKE 1 TABLET BY MOUTH TWICE DAILY    diltiaZEM (CARDIZEM CD) 120 MG Cp24 diltiazem  mg capsule,extended release 24 hr   TAKE ONE CAPSULE BY MOUTH ONCE DAILY    doxepin (SINEQUAN) 25 MG capsule doxepin 25 mg capsule   TAKE ONE CAPSULE BY MOUTH EACH NIGHT AT BEDTIME FOR 90 DAYS    memantine (NAMENDA) 5 MG Tab Take 5 mg by mouth 2 (two) times daily.    metoprolol succinate (TOPROL-XL) 25 MG 24 hr tablet metoprolol succinate ER 25 mg tablet,extended release 24 hr   Take 1 tablet BY MOUTH every DAY    potassium chloride (KLOR-CON) 10 MEQ TbSR Take 10 mEq by mouth  once.       Review of patient's allergies indicates:  No Known Allergies    Past Medical History:   Diagnosis Date    Dementia      History reviewed. No pertinent surgical history.  Family History    None       Tobacco Use    Smoking status: Not on file    Smokeless tobacco: Not on file   Substance and Sexual Activity    Alcohol use: Not on file    Drug use: Not on file    Sexual activity: Not on file     Review of Systems   Unable to perform ROS: Mental status change (altered mental status)   Objective:     Vital Signs (Most Recent):  Temp: 97.5 °F (36.4 °C) (06/26/23 1800)  Pulse: 66 (06/26/23 1800)  Resp: (!) 30 (06/26/23 1849)  BP: 112/78 (06/26/23 1800)  SpO2: 99 % (06/26/23 1800) Vital Signs (24h Range):  Temp:  [91.1 °F (32.8 °C)-97.5 °F (36.4 °C)] 97.5 °F (36.4 °C)  Pulse:  [47-79] 66  Resp:  [20-39] 30  SpO2:  [78 %-100 %] 99 %  BP: (112-212)/() 112/78     Weight: 54.4 kg (120 lb)  Body mass index is 22.67 kg/m².    Date 06/26/23 0700 - 06/27/23 0659   Shift 7926-7662 9378-4391 3516-8315 24 Hour Total   INTAKE   IV Piggyback 1231.5   1231.5   Shift Total(mL/kg) 1231.5(22.6)   1231.5(22.6)   OUTPUT   Shift Total(mL/kg)       Weight (kg) 54.4 54.4 54.4 54.4     Physical Exam:  Resting comfortably on ED stretcher  Voice is strong and without dysphonia  Nasal septum straight, no evidence of hematoma  Bruising and mild laceration of the distal tongue tip, approximately 5 mm in anteroposterior length, barely hanging on, easily debrided with scissors and pickups  Full tongue range of motion bilaterally     Significant Labs:  CBC:   Recent Labs   Lab 06/26/23  0743 06/26/23  1117   WBC 17.19*  --    RBC 4.00  --    HGB 11.0*  --    HCT 36.4* 39     --    MCV 91  --    MCH 27.5  --    MCHC 30.2*  --      CMP:   Recent Labs   Lab 06/26/23  0743 06/26/23  1121 06/26/23  1512 06/26/23  1817   GLU 99   < > 105 110   CALCIUM 7.6*   < > 8.3* 7.8*   ALBUMIN 3.0*  --   --   --    PROT 5.2*  --   --   --    NA  129*   < > 128* 126*   K 7.0*   < > 5.9*  --    CO2 9*   < > 12* 12*      < > 100 95   BUN 60*   < > 66* 66*   CREATININE 1.7*   < > 1.9* 2.1*   ALKPHOS 251*  --   --   --    ALT 2,411*  --   --   --    AST 2,083*  --   --   --    BILITOT 1.5*  --   --   --     < > = values in this interval not displayed.     Significant Diagnostics:  I have reviewed and interpreted all pertinent imaging results/findings within the past 24 hours.

## 2023-06-26 NOTE — HPI
78 yo F with hx of CABG, HTN, dementia presents to the ED for frequent falls and altered mental status. ENT was consulted for tongue lacerations from falls. Her daughter at bedside states that they found her this morning on the ground with blood around her mouth. They are unsure if she hit her head or loss consciousness. She has been more confused within the last two weeks and has been falling more often. Complete history unable to be obtained from patient due to mental status.

## 2023-06-26 NOTE — ACP (ADVANCE CARE PLANNING)
Discussed with patient's son (POA) current clinical status. Discussed work up continuing, but that patient is being treated for possible infection and multiorgan failure including liver, Kidney dysfunction, heart failure. Pt noted to have layered ventricular thrombus. Son felt that patient has been having progressively worsening quality of life and was unhappy with having to move to [place] despite it being in her best interest. Had planned to be moved to an assisted living facility today. He does not believe that she would want to have invasive procedures such as intubation, chest compressions, dialysis, defibrillation. Have made patient DNR and will continue to update family as patient clinical status develops. Discussion lasted 3-10 min.     Ishan Downing MD  [facility]

## 2023-06-26 NOTE — CLINICAL REVIEW
IP Sepsis Screen (most recent)       Sepsis Screen (IP) - 06/26/23 0912       Is the patient's history or complaint suggestive of a possible infection? Yes  -CB    Are there at least two of the following signs and symptoms present? Yes  -CB    Sepsis signs/symptoms - Hyper or Hypothermia Hyperthermia >100.4 or Hypothermia < 96.8  -CB    Sepsis signs/symptoms - Tachypnea Tachypnea     >20  -CB    Sepsis signs/symptoms - WBC WBC < 4,000 or WBC > 12,000  -CB    Are any of the following organ dysfunction criteria present and not considered to be due to a chronic condition? Yes  -CB    Organ Dysfunction Criteria Lactate > 2.0  -CB    Organ Dysfunction Criteria INR > 1.5 or aPTT > 60  -CB    Initiate Sepsis Protocol No  -CB    Reason sepsis not considered Pt. receiving appropriate management   abx therapy -CB              User Key  (r) = Recorded By, (t) = Taken By, (c) = Cosigned By      Initials Name    CB Rossi Negrete RN

## 2023-06-26 NOTE — ASSESSMENT & PLAN NOTE
78 yo F s/p fall sustaining laceration tongue. Bedside debridement performed. Patient still has full range of motion for tongue and speaking without dysarthria.    -- Hold all anticoagulation for the next 48 hours  -- Peridex mouth wash TID with meals for the next 2 weeks  -- Dysphagia soft diet or anything that patient can tolerate  -- If having oral pain, can add on Magic Mouthwash or [employer]'s solution TID PRN  -- Please Secure Chat me for any questions, page ENT on-call if unresponsive or urgent

## 2023-06-26 NOTE — ED TRIAGE NOTES
Pt arrives via EMS from home with complaints of altered mental status. Pt has had numerous falls. Pt has history of dementia. Unable to receive any history from patient. Patient endorses left hip pain.

## 2023-06-27 NOTE — PROGRESS NOTES
VANCOMYCIN DOSING BY PHARMACY DISCONTINUATION NOTE    Dom Yates is a 77 y.o. female who had been consulted for vancomycin dosing.    The pharmacy consult for vancomycin dosing has been discontinued.     Vancomycin Dosing by Pharmacy Consult will sign-off. Please reconsult if necessary. Thank you for allowing us to participate in this patient's care.       Ana Nance, PharmD  [phone]

## 2023-06-27 NOTE — ASSESSMENT & PLAN NOTE
This patient does have evidence of infective focus  My overall impression is sepsis.  Source: Urinary Tract  Antibiotics given-   Antibiotics (72h ago, onward)    None        Latest lactate reviewed-  Recent Labs   Lab 06/26/23  1121 06/26/23  1512   LACTATE 6.9* 5.6*     Organ dysfunction indicated by Acute kidney injury, Acute liver injury and Encephalopathy    Fluid challenge Not needed - patient is not hypotensive      Post- resuscitation assessment Yes Perfusion exam was performed within 6 hours of septic shock presentation after bolus shows Adequate tissue perfusion assessed by non-invasive monitoring       Will Not start Pressors  Source control achieved by: Cefepime/Vanc  Patient presents with AMS, leukocytosis, Lactate 6, hypothermia; bradycardia

## 2023-06-27 NOTE — PLAN OF CARE
Problem: Infection  Goal: Absence of Infection Signs and Symptoms  Outcome: Ongoing, Progressing     Problem: Adult Inpatient Plan of Care  Goal: Plan of Care Review  Outcome: Ongoing, Progressing  Goal: Patient-Specific Goal (Individualized)  Outcome: Ongoing, Progressing  Goal: Absence of Hospital-Acquired Illness or Injury  Outcome: Ongoing, Progressing  Goal: Optimal Comfort and Wellbeing  Outcome: Ongoing, Progressing  Goal: Readiness for Transition of Care  Outcome: Ongoing, Progressing     Problem: Fluid and Electrolyte Imbalance (Acute Kidney Injury/Impairment)  Goal: Fluid and Electrolyte Balance  Outcome: Ongoing, Progressing     Problem: Oral Intake Inadequate (Acute Kidney Injury/Impairment)  Goal: Optimal Nutrition Intake  Outcome: Ongoing, Progressing     Problem: Renal Function Impairment (Acute Kidney Injury/Impairment)  Goal: Effective Renal Function  Outcome: Ongoing, Progressing     Problem: Adjustment to Illness (Sepsis/Septic Shock)  Goal: Optimal Coping  Outcome: Ongoing, Progressing     Problem: Bleeding (Sepsis/Septic Shock)  Goal: Absence of Bleeding  Outcome: Ongoing, Progressing     Problem: Glycemic Control Impaired (Sepsis/Septic Shock)  Goal: Blood Glucose Level Within Desired Range  Outcome: Ongoing, Progressing     Problem: Infection Progression (Sepsis/Septic Shock)  Goal: Absence of Infection Signs and Symptoms  Outcome: Ongoing, Progressing     Problem: Nutrition Impaired (Sepsis/Septic Shock)  Goal: Optimal Nutrition Intake  Outcome: Ongoing, Progressing     Problem: Skin Injury Risk Increased  Goal: Skin Health and Integrity  Outcome: Ongoing, Progressing

## 2023-06-27 NOTE — ASSESSMENT & PLAN NOTE
Pt with LV thrombus with evidence of thrombotic emboli, acute metabolic encephalopathy, EDWIN, ischemic liver without ability to anticoagulate due to trauma to tongue. Acute event yesterday with hypotension, hypoxemia while turning. Multiple conversations with son (POA) who felt it was in her best interest to be comfort care. Planning to transition to hospice. [facility] consulted.

## 2023-06-27 NOTE — PLAN OF CARE
CMICU DAILY GOALS       A: Awake    RASS: Goal -    Actual - RASS (Salcedo Agitation-Sedation Scale): restless   Restraint necessity:    B: Breathe   SBT: Not intubated   C: Coordinate A & B, analgesics/sedatives   Pain: managed    SAT: Not intubated  D: Delirium   CAM-ICU: Overall CAM-ICU: Positive  E: Early(intubated/ Progressive (non-intubated) Mobility   MOVE Screen: Fail   Activity: Activity Management: Rolling - L1  FAS: Feeding/Nutrition   Diet order: Diet/Nutrition Received: NPO,    T: Thrombus   DVT prophylaxis: VTE Required Core Measure: Pharmacological prophylaxis initiated/maintained  H: HOB Elevation   Head of Bed (HOB) Positioning: HOB at 20-30 degrees  U: Ulcer Prophylaxis   GI: yes  G: Glucose control   managed Glycemic Management: blood glucose monitored  S: Skin      Device Skin Pressure Protection: absorbent pad utilized/changed  Pressure Reduction Devices: positioning supports utilized  Pressure Reduction Techniques: weight shift assistance provided  Skin Protection: adhesive use limited  B: Bowel Function   no issues   I: Indwelling Catheters   Russell necessity:      Urethral Catheter 06/26/23 0837 Temperature probe 16 Fr.-Reason for Continuing Urinary Catheterization: Critically ill in ICU and requiring hourly monitoring of intake/output   CVC necessity: No  D: De-escalation Antibiotics   Yes    Family/Goals of care/Code Status   Code Status: DNR    24H Vital Sign Range  Temp:  [91.1 °F (32.8 °C)-97.7 °F (36.5 °C)]   Pulse:  [47-79]   Resp:  [8-55]   BP: ()/()   SpO2:  [78 %-100 %]      Shift Events   Management of comfort and pain    VS and assessment per flow sheet, patient progressing towards goals as tolerated, plan of care reviewed with family, all concerns addressed, will continue to monitor.    Zoe Vargas

## 2023-06-27 NOTE — CONSULTS
[facility] - Cardiac Medical ICU  Otorhinolaryngology-Head & Neck Surgery  Consult Note    Patient Name: Dom Yates  MRN: [id number]  Code Status: DNR  Admission Date: 6/26/2023  Hospital Length of Stay: 0 days  Attending Physician: Ishan Downing MD  Primary Care Provider: Primary Doctor No    Patient information was obtained from relative(s) and past medical records.     Inpatient consult to ENT  Consult performed by: Cornelio Tse MD  Consult ordered by: Rosio Draper MD        Subjective:     Chief Complaint/Reason for Admission: tongue laceration    History of Present Illness: 78 yo F with hx of CABG, HTN, dementia presents to the ED for frequent falls and altered mental status. ENT was consulted for tongue lacerations from falls. Her daughter at bedside states that they found her this morning on the ground with blood around her mouth. They are unsure if she hit her head or loss consciousness. She has been more confused within the last two weeks and has been falling more often. Complete history unable to be obtained from patient due to mental status.       Medications:  Continuous Infusions:   dexmedeTOMIDine (Precedex) infusion (titrating) 0.2 mcg/kg/hr (06/26/23 1808)    sodium bicarbonate drip 100 mL/hr at 06/26/23 1324     Scheduled Meds:   acetylcysteine  50 mg/kg Intravenous Once    acetylcysteine  100 mg/kg Intravenous Once    calcium gluconate        ceFEPime (MAXIPIME) IVPB  1 g Intravenous Q24H    famotidine (PF)  20 mg Intravenous Daily    mupirocin   Nasal BID     PRN Meds:sodium chloride, dextrose 10%, sodium chloride 0.9%, Pharmacy to dose Vancomycin consult **AND** vancomycin - pharmacy to dose     No current facility-administered medications on file prior to encounter.     Current Outpatient Medications on File Prior to Encounter   Medication Sig    albuterol (PROVENTIL/VENTOLIN HFA) 90 mcg/actuation inhaler albuterol sulfate HFA 90 mcg/actuation aerosol inhaler   INHALE 2 PUFFS BY MOUTH 4  TIMES DAILY    amiodarone (PACERONE) 200 MG Tab amiodarone 200 mg tablet    amLODIPine (NORVASC) 10 MG tablet Take 10 mg by mouth.    buPROPion (WELLBUTRIN XL) 150 MG TB24 tablet bupropion HCl  mg 24 hr tablet, extended release   TAKE 1 TABLET BY MOUTH EVERY MORNING FOR 30 DAYS    celecoxib (CELEBREX) 200 MG capsule Take 200 mg by mouth.    chlordiazepoxide (LIBRIUM) 5 MG capsule chlordiazepoxide 5 mg capsule    dextroamphetamine-amphetamine 30 mg Tab dextroamphetamine-amphetamine 30 mg tablet   TAKE 1 TABLET BY MOUTH TWICE DAILY    diltiaZEM (CARDIZEM CD) 120 MG Cp24 diltiazem  mg capsule,extended release 24 hr   TAKE ONE CAPSULE BY MOUTH ONCE DAILY    doxepin (SINEQUAN) 25 MG capsule doxepin 25 mg capsule   TAKE ONE CAPSULE BY MOUTH EACH NIGHT AT BEDTIME FOR 90 DAYS    memantine (NAMENDA) 5 MG Tab Take 5 mg by mouth 2 (two) times daily.    metoprolol succinate (TOPROL-XL) 25 MG 24 hr tablet metoprolol succinate ER 25 mg tablet,extended release 24 hr   Take 1 tablet BY MOUTH every DAY    potassium chloride (KLOR-CON) 10 MEQ TbSR Take 10 mEq by mouth once.       Review of patient's allergies indicates:  No Known Allergies    Past Medical History:   Diagnosis Date    Dementia      History reviewed. No pertinent surgical history.  Family History    None       Tobacco Use    Smoking status: Not on file    Smokeless tobacco: Not on file   Substance and Sexual Activity    Alcohol use: Not on file    Drug use: Not on file    Sexual activity: Not on file     Review of Systems   Unable to perform ROS: Mental status change (altered mental status)   Objective:     Vital Signs (Most Recent):  Temp: 97.5 °F (36.4 °C) (06/26/23 1800)  Pulse: 66 (06/26/23 1800)  Resp: (!) 30 (06/26/23 1849)  BP: 112/78 (06/26/23 1800)  SpO2: 99 % (06/26/23 1800) Vital Signs (24h Range):  Temp:  [91.1 °F (32.8 °C)-97.5 °F (36.4 °C)] 97.5 °F (36.4 °C)  Pulse:  [47-79] 66  Resp:  [20-39] 30  SpO2:  [78 %-100 %] 99 %  BP:  (112-212)/() 112/78     Weight: 54.4 kg (120 lb)  Body mass index is 22.67 kg/m².    Date 06/26/23 0700 - 06/27/23 0659   Shift 8681-7140 1302-3388 5449-7554 24 Hour Total   INTAKE   IV Piggyback 1231.5   1231.5   Shift Total(mL/kg) 1231.5(22.6)   1231.5(22.6)   OUTPUT   Shift Total(mL/kg)       Weight (kg) 54.4 54.4 54.4 54.4     Physical Exam:  Resting comfortably on ED stretcher  Voice is strong and without dysphonia  Nasal septum straight, no evidence of hematoma  Bruising and mild laceration of the distal tongue tip, approximately 5 mm in anteroposterior length, barely hanging on, easily debrided with scissors and pickups  Full tongue range of motion bilaterally    Tongue Debridement Procedure Note  Verbal consent obtained. Anterior tip of the tongue was excised with sharp scissors and pickups, including muscle and soft tissue. It was left open to granulate.     Significant Labs:  CBC:   Recent Labs   Lab 06/26/23  0743 06/26/23  1117   WBC 17.19*  --    RBC 4.00  --    HGB 11.0*  --    HCT 36.4* 39     --    MCV 91  --    MCH 27.5  --    MCHC 30.2*  --      CMP:   Recent Labs   Lab 06/26/23  0743 06/26/23  1121 06/26/23  1512 06/26/23  1817   GLU 99   < > 105 110   CALCIUM 7.6*   < > 8.3* 7.8*   ALBUMIN 3.0*  --   --   --    PROT 5.2*  --   --   --    *   < > 128* 126*   K 7.0*   < > 5.9*  --    CO2 9*   < > 12* 12*      < > 100 95   BUN 60*   < > 66* 66*   CREATININE 1.7*   < > 1.9* 2.1*   ALKPHOS 251*  --   --   --    ALT 2,411*  --   --   --    AST 2,083*  --   --   --    BILITOT 1.5*  --   --   --     < > = values in this interval not displayed.     Significant Diagnostics:  I have reviewed and interpreted all pertinent imaging results/findings within the past 24 hours.    Assessment/Plan:     Tongue laceration  76 yo F s/p fall sustaining laceration tongue. Bedside debridement performed. Patient still has full range of motion for tongue and speaking without dysarthria.    -- Hold  all anticoagulation for the next 48 hours  -- Peridex mouth wash TID with meals for the next 2 weeks  -- Dysphagia soft diet or anything that patient can tolerate  -- If having oral pain, can add on Magic Mouthwash or Duke's solution TID PRN  -- Please Secure Chat me for any questions, page ENT on-call if unresponsive or urgent        VTE Risk Mitigation (From admission, onward)           Ordered     IP VTE HIGH RISK PATIENT  Once         06/26/23 1011     Place sequential compression device  Until discontinued         06/26/23 1011                    Thank you for your consult. I will sign off. Please contact us if you have any additional questions.    Cornelio Tse MD  Otorhinolaryngology-Head & Neck Surgery  [facility] - [facility]

## 2023-06-27 NOTE — ASSESSMENT & PLAN NOTE
Discussed with the patient's son, Darrion, the likely etiology of her presentation today (LV thrombus) and risk of starting heparin drip with her tongue injury and risk for bleeding/aspiration. He believes his mother wishes for the most conservative management possible and agrees with holding heparin drip until ENT has evaluated her tongue injury. He is aware of her risk for stroke and/or worsening organ failure.    Plan for hospice evaluation today.

## 2023-06-27 NOTE — PLAN OF CARE
Per medical management tx team, patient prognosis is poor and patient will benefit from hospice care for pain control.  Patient is not able to transfer out of the hospital so tx team would like to convert CMICU bed to [facility] hospice care.  SW has completed referral to [facility] and family is agreeable (Darrion Yates,son and wife (Annika) at bedside.    Lluvia Reynoso, LMSW [facility]  [phone]

## 2023-06-27 NOTE — ASSESSMENT & PLAN NOTE
Patient is confused; unclear how she bit her tongue (possibly during fall). Distal tongue with laceration; macerated

## 2023-06-27 NOTE — PLAN OF CARE
[facility] - Cardiac Medical ICU  Discharge Final Note    Primary Care Provider: Primary Doctor No    Expected Discharge Date: 6/30/2023    Final Discharge Note (most recent)       Final Note - 06/27/23 1500          Final Note    Assessment Type Final Discharge Note     Anticipated Discharge Disposition Hospice/Medical Facility   [facility]    What phone number can be called within the next 1-3 days to see how you are doing after discharge? [phone]     Hospital Resources/Appts/Education Provided Community resources provided        Post-Acute Status    Post-Acute Authorization Hospice     Hospice Status Set-up Complete/Auth obtained     Coverage [employer] Managed Medicare     Discharge Delays None known at this time                     Important Message from Medicare      Patient discharged to [facility] ([facility]). Family available for assessment.   Information given to son per medical staff on inpt hospice procotol.      Lluvia Reynoso LMSW  [facility]  [phone]

## 2023-06-27 NOTE — ASSESSMENT & PLAN NOTE
Cr 1.7 with K 7 and Bicarb 7 on admission. Family opting for conservative measures; no dialysis.

## 2023-06-27 NOTE — ASSESSMENT & PLAN NOTE
LFTs >2000 with T. Bili 1.5. Initially thought to be due to hypotension but BP stable on admission. Likely microemboli from LV thrombus. Tylenol level negative.

## 2023-06-27 NOTE — SUBJECTIVE & OBJECTIVE
Interval History/Significant Events: Pt with acute episode of     Review of Systems   Unable to perform ROS: Acuity of condition   Objective:     Vital Signs (Most Recent):  Temp: 96.4 °F (35.8 °C) (06/27/23 0905)  Pulse: (!) 58 (06/27/23 0905)  Resp: 15 (06/27/23 1023)  BP: (!) 98/55 (06/27/23 0700)  SpO2: (!) 93 % (06/27/23 0742) Vital Signs (24h Range):  Temp:  [93.4 °F (34.1 °C)-97.7 °F (36.5 °C)] 96.4 °F (35.8 °C)  Pulse:  [53-79] 58  Resp:  [8-55] 15  SpO2:  [78 %-100 %] 93 %  BP: ()/(48-98) 98/55   Weight: 54.4 kg (120 lb)  Body mass index is 22.67 kg/m².      Intake/Output Summary (Last 24 hours) at 6/27/2023 1107  Last data filed at 6/26/2023 1800  Gross per 24 hour   Intake 1310.73 ml   Output --   Net 1310.73 ml          Physical Exam  Vitals reviewed.   Constitutional:       Appearance: She is ill-appearing and toxic-appearing.      Comments: Hypothermic; mottled   HENT:      Head: Normocephalic and atraumatic.      Mouth/Throat:      Comments: Laceration and maceration to distal tongue  Cardiovascular:      Rate and Rhythm: Bradycardia present.      Heart sounds: S1 normal and S2 normal. No murmur heard.  Pulmonary:      Effort: Pulmonary effort is normal. No tachypnea or respiratory distress.      Breath sounds: Normal breath sounds. No decreased breath sounds, wheezing, rhonchi or rales.   Abdominal:      General: Abdomen is protuberant.      Palpations: Abdomen is soft.      Tenderness: There is no abdominal tenderness. There is no guarding or rebound.   Musculoskeletal:      Cervical back: Neck supple.   Skin:     Coloration: Skin is mottled.   Neurological:      GCS: GCS eye subscore is 4. GCS verbal subscore is 4. GCS motor subscore is 6.          Vents:     Lines/Drains/Airways       Drain  Duration                  Urethral Catheter 06/26/23 0837 Temperature probe 16 Fr. 1 day              Peripheral Intravenous Line  Duration                  Peripheral IV - Single Lumen 06/26/23 0754 20  G Anterior;Left Hand 1 day         Peripheral IV - Single Lumen 06/26/23 20 G Right;Lateral Forearm 1 day         Midline Catheter Insertion/Assessment  - Single Lumen 06/26/23 1540 Right basilic vein (medial side of arm) 18g x 8cm <1 day                  Significant Labs:    CBC/Anemia Profile:  Recent Labs   Lab 06/26/23  0740 06/26/23  0743 06/26/23  1117   WBC  --  17.19*  --    HGB  --  11.0*  --    HCT 37 36.4* 39   PLT  --  325  --    MCV  --  91  --    RDW  --  17.0*  --         Chemistries:  Recent Labs   Lab 06/26/23  0743 06/26/23  1121 06/26/23  1512 06/26/23  1817   * 128* 128* 126*   K 7.0* 6.0* 5.9* 6.5*    103 100 95   CO2 9* 7* 12* 12*   BUN 60* 58* 66* 66*   CREATININE 1.7* 1.6* 1.9* 2.1*   CALCIUM 7.6* 7.9* 8.3* 7.8*   ALBUMIN 3.0*  --   --   --    PROT 5.2*  --   --   --    BILITOT 1.5*  --   --   --    ALKPHOS 251*  --   --   --    ALT 2,411*  --   --   --    AST 2,083*  --   --   --    MG 2.3  --   --   --        All pertinent labs within the past 24 hours have been reviewed.    Significant Imaging:  I have reviewed all pertinent imaging results/findings within the past 24 hours.

## 2023-06-27 NOTE — HOSPITAL COURSE
Mrs. Yates was transferred to [facility] on 6/26 with altered mental status, a severe lactic acidosis, kidney and liver injury and found to have a new left ventricular thrombus. She had an injury to her tongue requiring ENT evaluation and debridement- which complicated her need for anticoagulation. After discussions with her son were held regarding her life-threatening illness and goals of care- decision was made to avoid invasive procedures and to focus on her comfort. After decompensating that evening- transitioned to comfort care with plans to pursue hospice.

## 2023-06-27 NOTE — CARE UPDATE
This afternoon, Mrs. Yates had an acute episode of hypoxia and agitation. On arrival to bedside, she is mottled with cyanotic lips and SpO2 60's.    This occurred during an attempt to change her sheets. She did recover with a period of rest and non-rebreather. Her son was called to come in.    We discussed the likelihood that her clinical course will continue to worsen without anticoagulation, but also the risks this treatment would pose given her tongue laceration and risk for aspiration. I expressed my concern that she will likely worsen overnight.    Mr. Darrion Yates (her son) expresses that she would not want invasive procedures, life support and anything that will not result in a meaningful recovery. He expressed knowing that she would not want to live in a facility.     In the light of this, the decision was made jointly with our care team and Mr. Maier to transition Mrs. Yates to comfort-focused care. We will stop tests/procedures/medications that will not result in recovery and focus on treatments that aid in her comfort.    Annika Chao, [facility]  Pulmonary Critical Care  [phone]

## 2023-06-27 NOTE — DISCHARGE SUMMARY
[facility] - Cardiac Medical ICU  Critical Care Medicine  Discharge Summary      Patient Name: Dom Yates  MRN: [id number]  Admission Date: 6/26/2023  Hospital Length of Stay: 1 days  Discharge Date and Time:  06/27/2023 2:45 PM  Attending Physician: Ishan Downing MD   Discharging Provider: Annika Chao NP  Primary Care Provider: Primary Doctor No  Reason for Admission: Multi-organ failure    HPI:   Dom Yates is a 77 year old woman with PMH HTN, dementia, CAD s/p CABG in 1/2023. She was found to be encephalopathic and yelling early this morning by son, noted to have tongue laceration, EMS called at that time. Further history limited by patient's altered mental status. ER workup notable for leukocytosis to 17, ISTAT lactic acid 5.3, initial pH 7.0 on VBG, bicarb 9. AST and ALT > 2000, Tbili 1.5, INR 2.6. BNP elevated to 3700. Noted to have potassium 7, EDWIN with creatinine 1.7 (baseline around 0.8). UA concerning for UTI. Hypothermic to 91. She was placed on bear hugger and received a dose of vanc/zosyn, 1L NS, 50 meq bicarb, was shifted for her hyperkalemia. Her BP on cuff read elevated, however with weak pulses and mottled appearance in ER, concern that patient hypotensive and in shock, she was started on levo. Arterial line unable to be placed despite multiple attempts by ER team. On exam patient is confused but following commands and speaking. No focal neuro deficits noted. Abdomen SNT. Tongue with large bite wyatt/laceration, mouth with dried blood. Pending CT head/CAP.     Spoke with patient's son Ford over the phone ([phone]). Patient with multiple falls in [place], was hospitalized several times there. Brought from [place] about a week and a half ago, plan [facility]. Per son, patient with rapid decline in her clinical condition over the past week and a half. Became encephalopathic last night. [place] yelling around 4 am, son  found her on the floor, markedly encephalopathic, blood visible in mouth, appeared to be coming from her tongue at that time. Per son patient complaining of indigestion, but otherwise no subjective complaints over the past week. No history of GIB or seizures to his knowledge.       * No surgery found *    Indwelling Lines/Drains at Time of Discharge:   Lines/Drains/Airways     Drain  Duration                Urethral Catheter 06/26/23 0837 Temperature probe 16 Fr. 1 day              Hospital Course:   Mrs. Yates was transferred to [facility] on 6/26 with altered mental status, a severe lactic acidosis, kidney and liver injury and found to have a new left ventricular thrombus. She had an injury to her tongue requiring ENT evaluation and debridement- which complicated her need for anticoagulation. After discussions with her son were held regarding her life-threatening illness and goals of care- decision was made to avoid invasive procedures and to focus on her comfort. After decompensating that evening- transitioned to comfort care with plans to pursue hospice.      Consults (From admission, onward)        Status Ordering Provider     Inpatient consult to Midline team  Once        Provider:  (Not yet assigned)    Completed RINA JALLOH     Inpatient consult to ENT  Once        Provider:  (Not yet assigned)    Completed FAROOQ RUSH     Inpatient consult to Critical Care Medicine  Once        Provider:  (Not yet assigned)    Completed BOONE LOYA        Significant Labs:  All pertinent labs within the past 24 hours have been reviewed.    Significant Imaging:  I have reviewed all pertinent imaging results/findings within the past 24 hours.    Pending Diagnostic Studies:     None        Final Active Diagnoses:    Diagnosis Date Noted POA    PRINCIPAL PROBLEM:  EDWIN (acute kidney injury) [N17.9] 06/26/2023 Unknown    Dementia [F03.90] 06/26/2023 Unknown    Hyperkalemia [E87.5] 06/26/2023 Unknown     Transaminitis [R74.01] 06/26/2023 Unknown    Acute encephalopathy [G93.40] 06/26/2023 Unknown    Metabolic acidosis [E87.20] 06/26/2023 Yes    Tongue laceration [S01.512A] 06/26/2023 Yes    Severe sepsis [A41.9, R65.20] 06/26/2023 Yes    Left ventricular thrombus [I51.3] 06/26/2023 Yes    Acute combined systolic and diastolic heart failure [I50.41] 06/26/2023 Yes    Goals of care, counseling/discussion [Z71.89] 06/26/2023 Not Applicable    Comfort measures only status [Z51.5] 06/26/2023 Not Applicable      Problems Resolved During this Admission:     No new Assessment & Plan notes have been filed under this hospital service since the last note was generated.  Service: Critical Care Medicine    Discharged Condition: poor    Disposition: Hospice/Medical Facility      Patient Instructions:   No discharge procedures on file.  Medications:  Transfer Medications (for Discharge Readmit only):   Current Facility-Administered Medications   Medication Dose Route Frequency Provider Last Rate Last Admin    haloperidol lactate injection 1 mg  1 mg Intravenous Q4H PRN Annika Chao NP   1 mg at 06/27/23 0743    HYDROmorphone (PF) (DILAUDID) 20 mg in dextrose 5 % (D5W) 100 mL infusion  0-4 mg/hr Intravenous Continuous Annika Chao NP        HYDROmorphone IV bolus from bag/infusion 0.5 mg  0.5 mg Intravenous Q1H PRN Annika Chao NP        scopolamine 1.3-1.5 mg (1 mg over 3 days) 1 patch  1 patch Transdermal Q3 Days Annika Chao NP   1 patch at 06/27/23 1356    sodium chloride 0.9% flush 10 mL  10 mL Intravenous PRN MD Annika Velásquez NP  Critical Care Medicine  [facility] - Cardiac Medical ICU

## 2023-06-27 NOTE — ASSESSMENT & PLAN NOTE
Bicarb 7 on admission; lactate 6.9. Likely secondary to showering emboli to multiple organs. See goals of care plan.

## 2023-06-27 NOTE — PROGRESS NOTES
[facility] Medical [facility]  Critical Care Medicine  Progress Note    Patient Name: Dom Yates  MRN: [id number]  Admission Date: 6/26/2023  Hospital Length of Stay: 1 days  Code Status: DNR  Attending Provider: Ishan Downing MD  Primary Care Provider: Primary Doctor No   Principal Problem: EDWIN (acute kidney injury)    Subjective:     HPI:  Dom Yates is a 77 year old woman with PMH HTN, dementia, CAD s/p CABG in 1/2023. She was found to be encephalopathic and yelling early this morning by son, noted to have tongue laceration, EMS called at that time. Further history limited by patient's altered mental status. ER workup notable for leukocytosis to 17, ISTAT lactic acid 5.3, initial pH 7.0 on VBG, bicarb 9. AST and ALT > 2000, Tbili 1.5, INR 2.6. BNP elevated to 3700. Noted to have potassium 7, EDWIN with creatinine 1.7 (baseline around 0.8). UA concerning for UTI. Hypothermic to 91. She was placed on bear hugger and received a dose of vanc/zosyn, 1L NS, 50 meq bicarb, was shifted for her hyperkalemia. Her BP on cuff read elevated, however with weak pulses and mottled appearance in ER, concern that patient hypotensive and in shock, she was started on levo. Arterial line unable to be placed despite multiple attempts by ER team. On exam patient is confused but following commands and speaking. No focal neuro deficits noted. Abdomen SNT. Tongue with large bite wyatt/laceration, mouth with dried blood. Pending CT head/CAP.     Spoke with patient's son Ford over the phone ([phone]). Patient with multiple falls in [place], was hospitalized several times there. Brought from [place] about a week and a half ago, [facility]. Per son, patient with rapid decline in her clinical condition over the past week and a half. Became encephalopathic last night. [place] yelling around 4 am, son found her on the floor, markedly encephalopathic, blood visible  in mouth, appeared to be coming from her tongue at that time. Per son patient complaining of indigestion, but otherwise no subjective complaints over the past week. No history of GIB or seizures to his knowledge.       Hospital/ICU Course:  No notes on file    Interval History/Significant Events: Pt with acute episode of     Review of Systems   Unable to perform ROS: Acuity of condition   Objective:     Vital Signs (Most Recent):  Temp: 96.4 °F (35.8 °C) (06/27/23 0905)  Pulse: (!) 58 (06/27/23 0905)  Resp: 15 (06/27/23 1023)  BP: (!) 98/55 (06/27/23 0700)  SpO2: (!) 93 % (06/27/23 0742) Vital Signs (24h Range):  Temp:  [93.4 °F (34.1 °C)-97.7 °F (36.5 °C)] 96.4 °F (35.8 °C)  Pulse:  [53-79] 58  Resp:  [8-55] 15  SpO2:  [78 %-100 %] 93 %  BP: ()/(48-98) 98/55   Weight: 54.4 kg (120 lb)  Body mass index is 22.67 kg/m².      Intake/Output Summary (Last 24 hours) at 6/27/2023 1107  Last data filed at 6/26/2023 1800  Gross per 24 hour   Intake 1310.73 ml   Output --   Net 1310.73 ml          Physical Exam  Vitals reviewed.   Constitutional:       Appearance: She is ill-appearing and toxic-appearing.      Comments: Hypothermic; mottled   HENT:      Head: Normocephalic and atraumatic.      Mouth/Throat:      Comments: Laceration and maceration to distal tongue  Cardiovascular:      Rate and Rhythm: Bradycardia present.      Heart sounds: S1 normal and S2 normal. No murmur heard.  Pulmonary:      Effort: Pulmonary effort is normal. No tachypnea or respiratory distress.      Breath sounds: Normal breath sounds. No decreased breath sounds, wheezing, rhonchi or rales.   Abdominal:      General: Abdomen is protuberant.      Palpations: Abdomen is soft.      Tenderness: There is no abdominal tenderness. There is no guarding or rebound.   Musculoskeletal:      Cervical back: Neck supple.   Skin:     Coloration: Skin is mottled.   Neurological:      GCS: GCS eye subscore is 4. GCS verbal subscore is 4. GCS motor subscore is  6.          Vents:     Lines/Drains/Airways       Drain  Duration                  Urethral Catheter 06/26/23 0837 Temperature probe 16 Fr. 1 day              Peripheral Intravenous Line  Duration                  Peripheral IV - Single Lumen 06/26/23 0754 20 G Anterior;Left Hand 1 day         Peripheral IV - Single Lumen 06/26/23 20 G Right;Lateral Forearm 1 day         Midline Catheter Insertion/Assessment  - Single Lumen 06/26/23 1540 Right basilic vein (medial side of arm) 18g x 8cm <1 day                  Significant Labs:    CBC/Anemia Profile:  Recent Labs   Lab 06/26/23  0740 06/26/23  0743 06/26/23  1117   WBC  --  17.19*  --    HGB  --  11.0*  --    HCT 37 36.4* 39   PLT  --  325  --    MCV  --  91  --    RDW  --  17.0*  --         Chemistries:  Recent Labs   Lab 06/26/23  0743 06/26/23  1121 06/26/23  1512 06/26/23  1817   * 128* 128* 126*   K 7.0* 6.0* 5.9* 6.5*    103 100 95   CO2 9* 7* 12* 12*   BUN 60* 58* 66* 66*   CREATININE 1.7* 1.6* 1.9* 2.1*   CALCIUM 7.6* 7.9* 8.3* 7.8*   ALBUMIN 3.0*  --   --   --    PROT 5.2*  --   --   --    BILITOT 1.5*  --   --   --    ALKPHOS 251*  --   --   --    ALT 2,411*  --   --   --    AST 2,083*  --   --   --    MG 2.3  --   --   --        All pertinent labs within the past 24 hours have been reviewed.    Significant Imaging:  I have reviewed all pertinent imaging results/findings within the past 24 hours.      ABG  Recent Labs   Lab 06/26/23  1117   PH 7.101*   PO2 33*   PCO2 45.5*   HCO3 14.1*   BE -16     Assessment/Plan:     Neuro  Acute encephalopathy  Head CT negative; likely multifactorial in the setting of sepsis; dementia.      Dementia  On Wellbutrin and memantine    Cardiac/Vascular  Acute combined systolic and diastolic heart failure  Echo    Result Date: 6/26/2023  · A layered left ventricular thrombus is present.  · The left ventricle is mildly enlarged with eccentric hypertrophy and   severely decreased systolic function. The estimated  ejection fraction is   20%.  · The quantitatively derived ejection fraction is 21%.  · There is severe left ventricular global hypokinesis.  · Normal right ventricular size with normal right ventricular systolic   function.  · Left ventricular diastolic dysfunction.  · Biatrial enlargement.  · Mild to moderate tricuspid regurgitation.  · Mild aortic regurgitation.  · IVC not well visualized but CVP is at least 8 mmHg.           Left ventricular thrombus  Noted on admission Echo. Concerned that this is likely etiology of multiorgan failure.        Renal/  * EDWIN (acute kidney injury)  Cr 1.7 with K 7 and Bicarb 7 on admission. Family opting for conservative measures; no dialysis.        Metabolic acidosis  Bicarb 7 on admission; lactate 6.9. Likely secondary to showering emboli to multiple organs. See goals of care plan.      Hyperkalemia  See EDWIN    ID  Severe sepsis  This patient does have evidence of infective focus  My overall impression is sepsis.  Source: Urinary Tract  Antibiotics given-   Antibiotics (72h ago, onward)    None        Latest lactate reviewed-  Recent Labs   Lab 06/26/23  1121 06/26/23  1512   LACTATE 6.9* 5.6*     Organ dysfunction indicated by Acute kidney injury, Acute liver injury and Encephalopathy    Fluid challenge Not needed - patient is not hypotensive      Post- resuscitation assessment Yes Perfusion exam was performed within 6 hours of septic shock presentation after bolus shows Adequate tissue perfusion assessed by non-invasive monitoring       Will Not start Pressors  Source control achieved by: Cefepime/Vanc  Patient presents with AMS, leukocytosis, Lactate 6, hypothermia; bradycardia      GI  Transaminitis  LFTs >2000 with T. Bili 1.5. Initially thought to be due to hypotension but BP stable on admission. Likely microemboli from LV thrombus. Tylenol level negative.        Orthopedic  Tongue laceration  Patient is confused; unclear how she bit her tongue (possibly during fall).  Distal tongue with laceration; macerated      Palliative Care  Comfort measures only status  Pt with LV thrombus with evidence of thrombotic emboli, acute metabolic encephalopathy, EDWIN, ischemic liver without ability to anticoagulate due to trauma to tongue. Acute event yesterday with hypotension, hypoxemia while turning. Multiple conversations with son (POA) who felt it was in her best interest to be comfort care. Planning to transition to hospice. [facility] consulted.     Goals of care, counseling/discussion  Discussed with the patient's son, Darrion, the likely etiology of her presentation today (LV thrombus) and risk of starting heparin drip with her tongue injury and risk for bleeding/aspiration. He believes his mother wishes for the most conservative management possible and agrees with holding heparin drip until ENT has evaluated her tongue injury. He is aware of her risk for stroke and/or worsening organ failure.    Plan for hospice evaluation today.       Critical Care Time: 50 minutes  Critical secondary to Patient has a condition that poses threat to life and bodily function: LV thrombus with thrombotic emboli.       Critical care was time spent personally by me on the following activities: development of treatment plan with patient or surrogate and bedside caregivers, discussions with consultants, evaluation of patient's response to treatment, examination of patient, ordering and performing treatments and interventions, ordering and review of laboratory studies, ordering and review of radiographic studies, pulse oximetry, re-evaluation of patient's condition. This critical care time did not overlap with that of any other provider or involve time for any procedures.     Ishan Downing MD  Critical Care Medicine  [facility] - Cardiac Medical ICU

## 2023-06-28 NOTE — SIGNIFICANT EVENT
I was called to the bedside to pronounce that this patient under hospice care has . Bedside RN  and NP were present in the room.      There was not response to verbal or tactile stimuli. Pupils were dilated and fixed. No breath sounds were appreciated over either lung field. No carotid pulses were palpable. No heart sounds were auscultated over the precordium.      Patient pronounced dead on 2023. Time of death was confirmed by the nurse at 3:00 am.      Cause of death was Severe sepsis with multiorgan failure.       Family notified,  notified.     Sumeet Middleton DO  Staff Physician, Hospital Medicine.

## 2023-06-30 LAB
BACTERIA BLD CULT: ABNORMAL
BLD PROD TYP BPU: NORMAL
BLOOD UNIT EXPIRATION DATE: NORMAL
BLOOD UNIT TYPE CODE: 6200
BLOOD UNIT TYPE: NORMAL
CODING SYSTEM: NORMAL
CROSSMATCH INTERPRETATION: NORMAL
DISPENSE STATUS: NORMAL
NUM UNITS TRANS PACKED RBC: NORMAL

## 2023-07-02 LAB
BACTERIA BLD CULT: ABNORMAL

## 2023-07-05 NOTE — PHYSICIAN QUERY
"PT Name: Dom Yates  MR #: [id number]    DOCUMENTATION CLARIFICATION     CDS/: Johanna Balderas RN          Contact Information: [email or web address]    This form is a permanent document in the medical record.    Query Date: July 5, 2023  By submitting this query, we are merely seeking further clarification of documentation. Please utilize your independent clinical judgment when addressing the question(s) below.    The Medical Record contains the following:   Indicator Supporting Clinical Findings Location in Medical Record     x Documentation of "Debridement" Bruising and mild laceration of the distal tongue tip, approximately 5 mm in anteroposterior length, barely hanging on, easily debrided with scissors and pickups    Bedside debridement performed 6/26/2023 ENT Consult    Documentation of "I&D"      Other       Excisional debridement is the surgical removal or cutting away of such tissue, necrosis, or slough and is classified to the root operation "Excision." Use of a sharp instrument does not always indicate that an excisional debridement was performed. Minor removal of loose fragments with scissors or using a sharp instrument to scrape away tissue is not an excisional debridement. Excisional debridement involves the use of a scalpel to remove devitalized tissue.  Nonexcisional debridement is the nonoperative brushing, irrigating, scrubbing, or washing of devitalized tissue, necrosis, slough, or foreign material. Most nonexcisional debridement procedures are classified to the root operation "Extraction" (pulling or stripping out or off all or a portion of a body part by the use of force).     Provider, please provide further clarification on the procedure performed on the Tongue:    [   ] Non-excisional Debridement of muscle   [  x ] Excisional Debridement of muscle   [   ] Other Procedure (please specify): _____________            Reference:    ICD-10-CM/PCS Coding Clinic Third Quarter ICD-10, " Effective with discharges: October 7, 2015 [facility] [employer] § Excisional and nonexcisional debridement (2015).    Form No. 14289

## 2023-07-20 NOTE — DISCHARGE SUMMARY
[facility] - Card[facility] Medical [facility]  Critical Care Medicine  Discharge Summary        Patient Name: Dom Yates  MRN: [id number]  Admission Date: 6/26/2023  Hospital Length of Stay: 1 days  Discharge Date and Time:  06/27/2023 2:45 PM  Attending Physician: Ishan Downing MD   Discharging Provider: Annika Chao NP  Primary Care Provider: Primary Doctor No  Reason for Admission: Multi-organ failure     HPI:   Dom Yates is a 77 year old woman with PMH HTN, dementia, CAD s/p CABG in 1/2023. She was found to be encephalopathic and yelling early this morning by son, noted to have tongue laceration, EMS called at that time. Further history limited by patient's altered mental status. ER workup notable for leukocytosis to 17, ISTAT lactic acid 5.3, initial pH 7.0 on VBG, bicarb 9. AST and ALT > 2000, Tbili 1.5, INR 2.6. BNP elevated to 3700. Noted to have potassium 7, EDWIN with creatinine 1.7 (baseline around 0.8). UA concerning for UTI. Hypothermic to 91. She was placed on bear hugger and received a dose of vanc/zosyn, 1L NS, 50 meq bicarb, was shifted for her hyperkalemia. Her BP on cuff read elevated, however with weak pulses and mottled appearance in ER, concern that patient hypotensive and in shock, she was started on levo. Arterial line unable to be placed despite multiple attempts by ER team. On exam patient is confused but following commands and speaking. No focal neuro deficits noted. Abdomen SNT. Tongue with large bite wyatt/laceration, mouth with dried blood. Pending CT head/CAP.      Spoke with patient's son Ford over the phone ([phone]). Patient with multiple falls in [place], was hospitalized several times there. Brought from [place] about a week and a half ago, plan [facility]. Per son, patient with rapid decline in her clinical condition over the past week and a half. Became encephalopathic last night. [place] yelling around 4 am,  son found her on the floor, markedly encephalopathic, blood visible in mouth, appeared to be coming from her tongue at that time. Per son patient complaining of indigestion, but otherwise no subjective complaints over the past week. No history of GIB or seizures to his knowledge.         * No surgery found *     Indwelling Lines/Drains at Time of Discharge:       Lines/Drains/Airways            Drain  Duration                  Urethral Catheter 06/26/23 0837 Temperature probe 16 Fr. 1 day                 Hospital Course:   Mrs. Yates was transferred to [facility] on 6/26 with altered mental status, a severe lactic acidosis, kidney and liver injury and found to have a new left ventricular thrombus. She had an injury to her tongue requiring ENT evaluation and debridement- which complicated her need for anticoagulation. After discussions with her son were held regarding her life-threatening illness and goals of care- decision was made to avoid invasive procedures and to focus on her comfort. After decompensating that evening- transitioned to comfort care with plans to pursue hospice. Pt was kept comfortable per comfort care order set and passed away 6/28/23 at 0300.                Consults (From admission, onward)         Status Ordering Provider       Inpatient consult to Midline team  Once        Provider:  (Not yet assigned)    Completed RINA JALLOH       Inpatient consult to ENT  Once        Provider:  (Not yet assigned)    Completed FAROOQ RUSH       Inpatient consult to Critical Care Medicine  Once        Provider:  (Not yet assigned)    Completed BOONE LOYA          Significant Labs:  All pertinent labs within the past 24 hours have been reviewed.     Significant Imaging:  I have reviewed all pertinent imaging results/findings within the past 24 hours.         Pending Diagnostic Studies:      None                Final Active Diagnoses:     Diagnosis Date Noted POA    PRINCIPAL PROBLEM:  EDWIN (acute  kidney injury) [N17.9] 2023 Unknown    Dementia [F03.90] 2023 Unknown    Hyperkalemia [E87.5] 2023 Unknown    Transaminitis [R74.01] 2023 Unknown    Acute encephalopathy [G93.40] 2023 Unknown    Metabolic acidosis [E87.20] 2023 Yes    Tongue laceration [S01.512A] 2023 Yes    Severe sepsis [A41.9, R65.20] 2023 Yes    Left ventricular thrombus [I51.3] 2023 Yes    Acute combined systolic and diastolic heart failure [I50.41] 2023 Yes    Goals of care, counseling/discussion [Z71.89] 2023 Not Applicable    Comfort measures only status [Z51.5] 2023 Not Applicable       Problems Resolved During this Admission:      No new Assessment & Plan notes have been filed under this hospital service since the last note was generated.  Service: Critical Care Medicine     Discharged Condition:      Disposition: [place]     Patient Instructions:   No discharge procedures on file.